# Patient Record
Sex: MALE | Race: BLACK OR AFRICAN AMERICAN | Employment: FULL TIME | URBAN - METROPOLITAN AREA
[De-identification: names, ages, dates, MRNs, and addresses within clinical notes are randomized per-mention and may not be internally consistent; named-entity substitution may affect disease eponyms.]

---

## 2019-07-12 ENCOUNTER — HOSPITAL ENCOUNTER (OUTPATIENT)
Dept: PHYSICAL THERAPY | Age: 34
Discharge: HOME | End: 2019-07-12
Payer: COMMERCIAL

## 2019-07-12 PROCEDURE — 97161 PT EVAL LOW COMPLEX 20 MIN: CPT | Performed by: PHYSICAL THERAPIST

## 2019-07-12 PROCEDURE — 97016 VASOPNEUMATIC DEVICE THERAPY: CPT | Performed by: PHYSICAL THERAPIST

## 2019-07-12 PROCEDURE — 97110 THERAPEUTIC EXERCISES: CPT | Performed by: PHYSICAL THERAPIST

## 2019-07-12 NOTE — PROGRESS NOTES
PT INITIAL EVALUATION NOTE - [facility] 2-15    Patient Name: Oneida Yates  Date:2019  : 1985  [x]  Patient  Verified  Payor: BLUE MUNA / Plan: Juan Miguel Zheng [id number] PPO / Product Type: PPO /    In time:710a  Out time:825a  Total Treatment Time (min): 75  Total Timed Codes (min): 15  1:1 Treatment Time ( only): 15   Visit #: 1     Treatment Area: Left ankle pain [M25.572]    SUBJECTIVE  Pain Level (0-10 scale): 0/10  Any medication changes, allergies to medications, adverse drug reactions, diagnosis change, or new procedure performed?: [] No    [x] Yes (see summary sheet for update)  Subjective:    Mid 2019 injured left achilles and heel while playing basketball resulting in onset of pain. He then felt pain  and swelling after running after someone at work. 2019 s/p Left achilles tendon repair and removal of bone spur on calcaneous. Walking boot was removed on 2019. No pain with walking or going down stairs right now but he cannot run. Wants to return to running. 2019 is last schedule appointment with Dr Jose Seymour.        OBJECTIVE/EXAMINATION  Posture:  Pes planus foot posture  Other Observations:  Incision healed well, Edema present at left lower leg   Girth Measurement Left: Mid calf 40.5 cm, Malleoli 31.8 cm            Right: Mid calf 42.5 cm, Malleoli 42.5 cm   Gait and Functional Mobility:  Squat ankle DF 12  Palpation: tenderness at incision and calcaneus    Left Knee AROM 0/045 PROM 2/0/48  Ankle DF with knee flexed 2  Right Knee AROM 10/0/42 PROM 12/0/45  Ankle DF with knee flexed 13    Joint Mobility Assessment: talocrural A-P and P-A hypomobility    Flexibility: gastroc/solues stiffness                  LOWER QUARTER   MUSCLE STRENGTH  KEY       R  L  0 - No Contraction  Knee ext  5  5  1 - Trace            flex  5  5  2 - Poor   Hip ext   5  5  3 - Fair          flex   5  5  4 - Good         abd  5  5  5 - Normal         add  5  5      Ankle DF  5  >3                PF  5  >3                INV  5  >3                EV  5  >3      Neurological: Reflexes / Sensations: normal  Special Tests: Kiersten's sign: negative    Modality rationale: decrease edema and decrease pain to improve the patients ability to ambulate, run and cut   Min Type Additional Details    [] Estim: []Att   []Unatt        []TENS instruct                  []IFC  []Premod   []NMES                     []Other:  []w/US   []w/ice   []w/heat  Position:  Location:    []  Traction: [] Cervical       []Lumbar                       [] Prone          []Supine                       []Intermittent   []Continuous Lbs:  [] before manual  [] after manual  []w/heat    []  Ultrasound: []Continuous   [] Pulsed at:                           []1MHz   []3MHz Location:  W/cm2:    [] Paraffin         Location:   []w/heat    []  Ice     []  Heat  []  Ice massage Position:  Location:    []  Laser  []  Other: Position:  Location:   15   [x]  Vasopneumatic Device Pressure:       [] lo [x] med [] hi   Temperature: 29 F     [x] Skin assessment post-treatment:  [x]intact []redness- no adverse reaction    []redness  adverse reaction:     15 min Therapeutic Exercise:  [x] See flow sheet :   Rationale: increase ROM, increase strength, improve coordination, improve balance and increase proprioception to improve the patients ability to ambulate, squat, run and cut            With   [x] TE   [] TA   [] neuro   [] other: Patient Education: [x] Review HEP    [] Progressed/Changed HEP based on:   [] positioning   [] body mechanics   [] transfers   [] heat/ice application    [] other:      Other Objective/Functional Measures:  FOTO Functional Measure: 57/100    Pain Level (0-10 scale) post treatment: 0/10    ASSESSMENT/Changes in Function:     [x]  See Plan of Care      Jose Coyle, PT, DPT 7/12/2019

## 2019-07-16 ENCOUNTER — HOSPITAL ENCOUNTER (OUTPATIENT)
Dept: PHYSICAL THERAPY | Age: 34
Discharge: HOME | End: 2019-07-16
Payer: COMMERCIAL

## 2019-07-16 PROCEDURE — 97110 THERAPEUTIC EXERCISES: CPT | Performed by: PHYSICAL THERAPIST

## 2019-07-16 PROCEDURE — 97140 MANUAL THERAPY 1/> REGIONS: CPT | Performed by: PHYSICAL THERAPIST

## 2019-07-16 PROCEDURE — 97016 VASOPNEUMATIC DEVICE THERAPY: CPT | Performed by: PHYSICAL THERAPIST

## 2019-07-16 NOTE — PROGRESS NOTES
PT DAILY TREATMENT NOTE 2-15    Patient Name: Samantha Rod  Date:2019  : 1985  [x]  Patient  Verified  Payor: BLUE CROSS / Plan: [facility] PPO / Product Type: PPO /    In time:1037a  Out time:1147a  Total Treatment Time (min): 70  Visit #:  2    Treatment Area: Left ankle pain [M25.572]    SUBJECTIVE  Pain Level (0-10 scale): 0/10  Any medication changes, allergies to medications, adverse drug reactions, diagnosis change, or new procedure performed?: [x] No    [] Yes (see summary sheet for update)  Subjective functional status/changes:   [] No changes reported  Pt reports that he is doing well with the HEP. He has a compression sock that seems to be helping.     OBJECTIVE    Modality rationale: decrease edema and decrease pain to improve the patients ability to ambulation   Min Type Additional Details       [] Estim: []Att   []Unatt    []TENS instruct                  []IFC  []Premod   []NMES                     []Other:  []w/US   []w/ice   []w/heat  Position:  Location:       []  Traction: [] Cervical       []Lumbar                       [] Prone          []Supine                       []Intermittent   []Continuous Lbs:  [] before manual  [] after manual  []w/heat    []  Ultrasound: []Continuous   [] Pulsed                       at: []1MHz   []3MHz Location:  W/cm2:    [] Paraffin         Location:   []w/heat    []  Ice     []  Heat  []  Ice massage Position:  Location:    []  Laser  []  Other: Position:  Location:   15   []  Vasopneumatic Device Pressure:       [] lo [x] med [] hi   Temperature: 34F     [x] Skin assessment post-treatment:  [x]intact []redness- no adverse reaction    []redness  adverse reaction:          40 min Therapeutic Exercise:  [x] See flow sheet : passive ankle DF stretching   Rationale: increase ROM, increase strength, improve coordination, improve balance and increase proprioception to improve the patients ability to ambulate, run and cut    15 min Manual Therapy:  STM incision and calf muscle; A-P talocrural mobs grade 4   Rationale: decrease pain, increase ROM and increase tissue extensibility  to improve the patients ability to ambulate, run and cut      With   [] TE   [] TA   [] neuro   [] other: Patient Education: [x] Review HEP    [] Progressed/Changed HEP based on:   [] positioning   [] body mechanics   [] transfers   [] heat/ice application    [] other:      Other Objective/Functional Measures: Left Ankle DF PROM  2-->8     Pain Level (0-10 scale) post treatment: 0/10    ASSESSMENT/Changes in Function:   No increase of pain with stretching and strengthening activities today. Improved ankle DF following manual and stretching. Patient will continue to benefit from skilled PT services to modify and progress therapeutic interventions, address functional mobility deficits, address ROM deficits, address strength deficits, analyze and address soft tissue restrictions, analyze and cue movement patterns, analyze and modify body mechanics/ergonomics and assess and modify postural abnormalities to attain remaining goals. []  See Plan of Care  []  See progress note/recertification  []  See Discharge Summary         Progress towards goals / Updated goals:  Short Term Goals: To be accomplished in 3-4 weeks:  1)  Pt will be independent with HEP. 2) Pt will demonstrate >/= 6 degrees to be able to ambulate on level surface with more normalized gait without increase of pain  3) Pt will be able to navigate stairs with step over step pattern without pain.        Long Term Goals: To be accomplished in 20-24 weeks:  1)  Pt will be able to squat to the ground without deviation and even distribution of weight. 2) Pt will be able to perform jumping motion without valgus collapse of knee durign concentric and eccentric phases   3) Pt will be able to run >/= 1 mile without pain. 4) Pt will be able to perform lateral cutting movements without pain or instability.   5) Pt will be able to demonstrate rotational control with jumping movements >/= to contralateral limb.       PLAN  [x]  Upgrade activities as tolerated     [x]  Continue plan of care  [x]  Update interventions per flow sheet       []  Discharge due to:_  []  Other:_      Nj Corbin, PT, DPT 7/16/2019

## 2019-07-17 NOTE — PROGRESS NOTES
[facility]  [address] ([place]), [address] [facility] Donna Shankar  Phone: [phone] Fax: [phone]    Plan of Care/Statement of Necessity for Physical Therapy Services  2-15    Patient name: Henretta Cranker  : 1985  Provider#: [id number]  Referral source: Bienvenido Escobar MD      Medical/Treatment Diagnosis: Left ankle pain [M25.572]     Prior Hospitalization: see medical history     Comorbidities: none  Prior Level of Function: complete 20 minutes of exercise at least 3 times a week;  and  Jajah Umpire  Medications: Verified on Patient Summary List     Start of Care: 2019                                                                 Onset Date: 2019                                  The Plan of Care and following information is based on the information from the initial evaluation. Assessment/ rubio information: 37 y.o.  Male s/p Left achilles tendon re-insertion and bone excision with limitation in ROM, strength and functional ability secondary to tissue healing and surgical precautions.     Evaluation Complexity History LOW Complexity : Zero comorbidities / personal factors that will impact the outcome / POC; Examination HIGH Complexity : 4+ Standardized tests and measures addressing body structure, function, activity limitation and / or participation in recreation  ;Presentation LOW Complexity : Stable, uncomplicated  ;Clinical Decision Making MEDIUM Complexity : FOTO score of 26-74  Overall Complexity Rating: LOW      Problem List: pain affecting function, decrease ROM, decrease strength, edema affecting function, impaired gait/ balance, decrease ADL/ functional abilitiies, decrease activity tolerance and decrease flexibility/ joint mobility               Treatment Plan may include any combination of the following: Therapeutic exercise, Therapeutic activities, Neuromuscular re-education, Physical agent/modality, Gait/balance training, Manual therapy, Patient education and Self Care training  Patient / Family readiness to learn indicated by: asking questions and trying to perform skills  Persons(s) to be included in education: patient (P)  Barriers to Learning/Limitations: None  Patient Goal (s): return to running, squatting and work activities  Patient Self Reported Health Status: good  Rehabilitation Potential: good     Short Term Goals: To be accomplished in 3-4 weeks:  1)  Pt will be independent with HEP. 2) Pt will demonstrate >/= 6 degrees to be able to ambulate on level surface with more normalized gait without increase of pain  3) Pt will be able to navigate stairs with step over step pattern without pain.        Long Term Goals: To be accomplished in 20-24 weeks:  1)  Pt will be able to squat to the ground without deviation and even distribution of weight. 2) Pt will be able to perform jumping motion without valgus collapse of knee durign concentric and eccentric phases   3) Pt will be able to run >/= 1 mile without pain. 4) Pt will be able to perform lateral cutting movements without pain or instability. 5) Pt will be able to demonstrate rotational control with jumping movements >/= to contralateral limb.     Frequency / Duration: Patient to be seen 2 times per week for 20-24 weeks (will reduce to 1 time per week once edema and ROM stabilizes).      Patient/ Caregiver education and instruction: activity modification and exercises     [x]  Plan of care has been reviewed with TERENCE Floyd PT, DPT 7/12/2019   ________________________________________________________________________    I certify that the above Therapy Services are being furnished while the patient is under my care. I agree with the treatment plan and certify that this therapy is necessary.     [de-identified] Signature:____________________  Date:____________Time: _________

## 2019-07-19 ENCOUNTER — HOSPITAL ENCOUNTER (OUTPATIENT)
Dept: PHYSICAL THERAPY | Age: 34
Discharge: HOME | End: 2019-07-19
Payer: COMMERCIAL

## 2019-07-19 PROCEDURE — 97016 VASOPNEUMATIC DEVICE THERAPY: CPT

## 2019-07-19 PROCEDURE — 97140 MANUAL THERAPY 1/> REGIONS: CPT

## 2019-07-19 PROCEDURE — 97110 THERAPEUTIC EXERCISES: CPT

## 2019-07-19 NOTE — PROGRESS NOTES
PT DAILY TREATMENT NOTE - [facility] 2-15    Patient Name: Samantha Rod  Date:2019  : 1985  [x]  Patient  Verified  Payor: BLUE CROSS / Plan: Juan Miguel Zheng [id number] PPO / Product Type: PPO /    In time:809  Out time:915  Total Treatment Time (min): 66  Total Timed Codes (min): 66  1:1 Treatment Time (MC only): 39   Visit #:  3    Treatment Area: Left ankle pain [M25.572]    SUBJECTIVE  Pain Level (0-10 scale): 0/10  Any medication changes, allergies to medications, adverse drug reactions, diagnosis change, or new procedure performed?: [x] No    [] Yes (see summary sheet for update)  Subjective functional status/changes:   [] No changes reported  Patient reports he has been fatigued with the heel raises more than he had expected.     OBJECTIVE    Modality rationale: decrease inflammation and decrease pain to improve the patients ability to perform ADLs and reduce pain levels   Min Type Additional Details       [] Estim: []Att   []Unatt    []TENS instruct                  []IFC  []Premod   []NMES                     []Other:  []w/US   []w/ice   []w/heat  Position:  Location:       []  Traction: [] Cervical       []Lumbar                       [] Prone          []Supine                       []Intermittent   []Continuous Lbs:  [] before manual  [] after manual  []w/heat    []  Ultrasound: []Continuous   [] Pulsed                       at: []1MHz   []3MHz Location:  W/cm2:    [] Paraffin         Location:   []w/heat    []  Ice     []  Heat  []  Ice massage Position:  Location:    []  Laser  []  Other: Position:  Location:   15   [x]  Vasopneumatic Device Pressure:       [] lo [x] med [] hi   Temperature: 34     [x] Skin assessment post-treatment:  [x]intact []redness- no adverse reaction    []redness  adverse reaction:     41 min Therapeutic Exercise:  [x] See flow sheet :   Rationale: increase ROM and increase strength to improve the patients ability to perform ADLs and reduce pain levels     10 min Manual Therapy:  STM incision and calf muscle; A-P talocrural mobs grade 3-4   Rationale: decrease pain, increase ROM and increase tissue extensibility  to improve the patients ability to perform ADLs and return to a full work load     With   [] TEroc   [] TA   [] neuro   [] other: Patient Education: [x] Review HEP    [] Progressed/Changed HEP based on:   [] positioning   [] body mechanics   [] transfers   [] heat/ice application    [] other:      Other Objective/Functional Measures: none noted     Pain Level (0-10 scale) post treatment: 0/10    ASSESSMENT/Changes in Function:   Patient demonstrates instability with balancing therex. Noticeable tightness with the gastroc during manual. Will continue to progress as tolerated. Patient will continue to benefit from skilled PT services to modify and progress therapeutic interventions, address functional mobility deficits, address ROM deficits, address strength deficits, analyze and address soft tissue restrictions, analyze and cue movement patterns, analyze and modify body mechanics/ergonomics and assess and modify postural abnormalities to attain remaining goals. [x]  See Plan of Care  []  See progress note/recertification  []  See Discharge Summary         Progress towards goals / Updated goals:  Patient is progressing towards goals.      PLAN  [x]  Upgrade activities as tolerated     [x]  Continue plan of care  [x]  Update interventions per flow sheet       []  Discharge due to:_  []  Other:Melissa Cox 7/19/2019

## 2019-07-23 ENCOUNTER — HOSPITAL ENCOUNTER (OUTPATIENT)
Dept: PHYSICAL THERAPY | Age: 34
Discharge: HOME | End: 2019-07-23
Payer: COMMERCIAL

## 2019-07-23 PROCEDURE — 97140 MANUAL THERAPY 1/> REGIONS: CPT

## 2019-07-23 PROCEDURE — 97110 THERAPEUTIC EXERCISES: CPT

## 2019-07-23 PROCEDURE — 97016 VASOPNEUMATIC DEVICE THERAPY: CPT

## 2019-07-23 NOTE — PROGRESS NOTES
PT DAILY TREATMENT NOTE - [facility] 2-15    Patient Name: Henretta Cranker  Date:2019  : 1985  [x]  Patient  Verified  Payor: BLUE MUNA / Plan: Juan Miguel Zheng [id number] PPO / Product Type: PPO /    In time:432  Out time:550  Total Treatment Time (min): 78  Total Timed Codes (min): 78  1:1 Treatment Time (MC only): 40   Visit #:  4    Treatment Area: Left ankle pain [M25.572]    SUBJECTIVE   Pain Level (0-10 scale): 0/10  Any medication changes, allergies to medications, adverse drug reactions, diagnosis change, or new procedure performed?: [x] No    [] Yes (see summary sheet for update)  Subjective functional status/changes:   [] No changes reported  Patient reports he feels pressure when he is walking around like there is no cushion on his heel.     OBJECTIVE    Modality rationale: decrease inflammation and decrease pain to improve the patients ability to perform ADLs and reduce pain levels    Min Type Additional Details        [] Estim: []Att   []Unatt    []TENS instruct                  []IFC  []Premod   []NMES                     []Other:  []w/US   []w/ice   []w/heat  Position:  Location:        []  Traction: [] Cervical       []Lumbar                       [] Prone          []Supine                       []Intermittent   []Continuous Lbs:  [] before manual  [] after manual  []w/heat     []  Ultrasound: []Continuous   [] Pulsed                       at: []1MHz   []3MHz Location:  W/cm2:     [] Paraffin         Location:   []w/heat     []  Ice     []  Heat  []  Ice massage Position:  Location:     []  Laser  []  Other: Position:  Location:   15    [x]  Vasopneumatic Device Pressure:       [] lo [x] med [] hi   Temperature: 34      [x] Skin assessment post-treatment:  [x]intact []redness- no adverse reaction    []redness  adverse reaction:     53 min Therapeutic Exercise:  [x] See flow sheet :   Rationale: increase ROM and increase strength to improve the patients ability to perform ADLs and reduce pain levels    10 min Manual Therapy:  STM incision and calf muscle; A-P talocrural mobs grade 3-4   Rationale: decrease pain, increase ROM and increase tissue extensibility  to improve the patients ability to perform ADLs and return to a full work load     With   [] TE   [] TA   [] neuro   [] other: Patient Education: [x] Review HEP    [] Progressed/Changed HEP based on:   [] positioning   [] body mechanics   [] transfers   [] heat/ice application    [] other:      Other Objective/Functional Measures: none noted     Pain Level (0-10 scale) post treatment: 0/10    ASSESSMENT/Changes in Function:   Patient tolerated all new and progressed therex well. Slight difficulty performing single leg stance on foam but no discomfort or pain. Fair motion while using the BAPs board. Will continue to progress as tolerated. Patient will continue to benefit from skilled PT services to modify and progress therapeutic interventions, address functional mobility deficits, address ROM deficits, address strength deficits, analyze and address soft tissue restrictions, analyze and cue movement patterns, analyze and modify body mechanics/ergonomics and assess and modify postural abnormalities to attain remaining goals. [x]  See Plan of Care  []  See progress note/recertification  []  See Discharge Summary         Progress towards goals / Updated goals:  Patient is proigressing towards goals.      PLAN  [x]  Upgrade activities as tolerated     [x]  Continue plan of care  [x]  Update interventions per flow sheet       []  Discharge due to:_  []  Other:_      Alfred Olivier 7/23/2019

## 2019-07-26 ENCOUNTER — HOSPITAL ENCOUNTER (OUTPATIENT)
Dept: PHYSICAL THERAPY | Age: 34
Discharge: HOME | End: 2019-07-26
Payer: COMMERCIAL

## 2019-07-26 PROCEDURE — 97016 VASOPNEUMATIC DEVICE THERAPY: CPT

## 2019-07-26 PROCEDURE — 97110 THERAPEUTIC EXERCISES: CPT

## 2019-07-26 PROCEDURE — 97140 MANUAL THERAPY 1/> REGIONS: CPT

## 2019-07-26 NOTE — PROGRESS NOTES
PT DAILY TREATMENT NOTE - [facility] 2-15    Patient Name: Neha Roblero  Date:2019  : 1985  [x]  Patient  Verified  Payor: Coral Domínguez / Plan: Juan Miguel Zheng [id number] PPO / Product Type: PPO /    In time:8:32 am  Out time:9:58 am  Total Treatment Time (min): 86 minutes  Total Timed Codes (min): 71 minutes  1:1 Treatment Time (MC only): 24 minutes   Visit #:  5    Treatment Area: Left ankle pain [M25.572]    SUBJECTIVE   Pain Level (0-10 scale): 0/10  Any medication changes, allergies to medications, adverse drug reactions, diagnosis change, or new procedure performed?: [x] No    [] Yes (see summary sheet for update)  Subjective functional status/changes:   [] No changes reported  Pt reports he has been doing well, just mild discomfort with heel raises.     OBJECTIVE    Modality rationale: decrease inflammation and decrease pain to improve the patients ability to perform ADLs and reduce pain levels    Min Type Additional Details        [] Estim: []Att   []Unatt    []TENS instruct                  []IFC  []Premod   []NMES                     []Other:  []w/US   []w/ice   []w/heat  Position:  Location:        []  Traction: [] Cervical       []Lumbar                       [] Prone          []Supine                       []Intermittent   []Continuous Lbs:  [] before manual  [] after manual  []w/heat     []  Ultrasound: []Continuous   [] Pulsed                       at: []1MHz   []3MHz Location:  W/cm2:     [] Paraffin         Location:   []w/heat     []  Ice     []  Heat  []  Ice massage Position:  Location:     []  Laser  []  Other: Position:  Location:   15    [x]  Vasopneumatic Device Pressure:       [] lo [x] med [] hi   Temperature: 34      [x] Skin assessment post-treatment:  [x]intact []redness- no adverse reaction    []redness  adverse reaction:     59 min Therapeutic Exercise:  [x] See flow sheet :   Rationale: increase ROM and increase strength to improve the patients ability to perform ADLs and reduce pain levels    12 min Manual Therapy:  STM/MFR incision and gastroc; A-P talocrural mobs grade 3-4   Rationale: decrease pain, increase ROM and increase tissue extensibility  to improve the patients ability to perform ADLs and return to a full work load     With   [] TE   [] TA   [] neuro   [] other: Patient Education: [x] Review HEP    [] Progressed/Changed HEP based on:   [] positioning   [] body mechanics   [] transfers   [] heat/ice application    [] other:      Other Objective/Functional Measures: none noted     Pain Level (0-10 scale) post treatment: 0/10    ASSESSMENT/Changes in Function:   Turgor and tenderness noted within lateral left gastroc; eased discomfort/tightness with ambulation afterwards. Patient will continue to benefit from skilled PT services to modify and progress therapeutic interventions, address functional mobility deficits, address ROM deficits, address strength deficits, analyze and address soft tissue restrictions, analyze and cue movement patterns, analyze and modify body mechanics/ergonomics and assess and modify postural abnormalities to attain remaining goals. [x]  See Plan of Care  []  See progress note/recertification  []  See Discharge Summary         Progress towards goals / Updated goals:  Patient is proigressing towards goals.      PLAN  [x]  Upgrade activities as tolerated     [x]  Continue plan of care  [x]  Update interventions per flow sheet       []  Discharge due to:_  []  Other:_      Dominic Gavin 7/26/2019

## 2019-07-30 ENCOUNTER — HOSPITAL ENCOUNTER (OUTPATIENT)
Dept: PHYSICAL THERAPY | Age: 34
Discharge: HOME | End: 2019-07-30
Payer: COMMERCIAL

## 2019-07-30 PROCEDURE — 97110 THERAPEUTIC EXERCISES: CPT | Performed by: PHYSICAL THERAPIST

## 2019-07-30 PROCEDURE — 97016 VASOPNEUMATIC DEVICE THERAPY: CPT | Performed by: PHYSICAL THERAPIST

## 2019-07-30 PROCEDURE — 97140 MANUAL THERAPY 1/> REGIONS: CPT | Performed by: PHYSICAL THERAPIST

## 2019-07-30 NOTE — PROGRESS NOTES
PT DAILY TREATMENT NOTE - [facility] 2-15    Patient Name: Matthias Nearing  Date:2019  : 1985  [x]  Patient  Verified  Payor: Grabiel Maryam / Plan: Juan Miguel Zheng [id number] PPO / Product Type: PPO /    In time: 804a Out time: 944a  Total Treatment Time (min): 100  Total Timed Codes (min):  85  1:1 Treatment Time ( only): 40   Visit #:  6    Treatment Area: Left ankle pain [M25.572]    SUBJECTIVE   Pain Level (0-10 scale): 0/10  Any medication changes, allergies to medications, adverse drug reactions, diagnosis change, or new procedure performed?: [x] No    [] Yes (see summary sheet for update)  Subjective functional status/changes:   [] No changes reported  Pt reports that he was at a festival this weekend and he did start to notice soreness at his heel (achilles insertion area) after he got home. No pain today.     OBJECTIVE    Modality rationale: decrease inflammation and decrease pain to improve the patients ability to perform ADLs and reduce pain levels    Min Type Additional Details        [] Estim: []Att   []Unatt    []TENS instruct                  []IFC  []Premod   []NMES                     []Other:  []w/US   []w/ice   []w/heat  Position:  Location:        []  Traction: [] Cervical       []Lumbar                       [] Prone          []Supine                       []Intermittent   []Continuous Lbs:  [] before manual  [] after manual  []w/heat     []  Ultrasound: []Continuous   [] Pulsed                       at: []1MHz   []3MHz Location:  W/cm2:     [] Paraffin         Location:   []w/heat     []  Ice     []  Heat  []  Ice massage Position:  Location:     []  Laser  []  Other: Position:  Location:   15    [x]  Vasopneumatic Device Pressure:       [] lo [x] med [] hi   Temperature: 34      [x] Skin assessment post-treatment:  [x]intact []redness- no adverse reaction    []redness  adverse reaction:     70 min Therapeutic Exercise:  [x] See flow sheet : passive ankle DF stetching   Rationale: increase ROM and increase strength to improve the patients ability to perform ADLs and reduce pain levels    15 min Manual Therapy:  STM/MFR incision and gastroc; A-P talocrural mobs grade 3-4   Rationale: decrease pain, increase ROM and increase tissue extensibility  to improve the patients ability to perform ADLs and return to a full work load     With   [] TE   [] TA   [] neuro   [] other: Patient Education: [x] Review HEP    [] Progressed/Changed HEP based on:   [] positioning   [] body mechanics   [] transfers   [] heat/ice application    [] other:      Other Objective/Functional Measures: Left Ankle AROM DF 11   PROM DF 16     Pain Level (0-10 scale) post treatment: 0/10    ASSESSMENT/Changes in Function:   Ankle DF is progressing. Gait pattern improves with manual and therex with less of an early lift off. Early lift off returns after game ready ice/compression. Patient will continue to benefit from skilled PT services to modify and progress therapeutic interventions, address functional mobility deficits, address ROM deficits, address strength deficits, analyze and address soft tissue restrictions, analyze and cue movement patterns, analyze and modify body mechanics/ergonomics and assess and modify postural abnormalities to attain remaining goals. [x]  See Plan of Care  []  See progress note/recertification  []  See Discharge Summary         Progress towards goals / Updated goals:  Short Term Goals: To be accomplished in 3-4 weeks:  1)  Pt will be independent with HEP. MET  2) Pt will demonstrate >/= 6 degrees to be able to ambulate on level surface with more normalized gait without increase of pain MET  3) Pt will be able to navigate stairs with step over step pattern without pain. MET      Long Term Goals: To be accomplished in 20-24 weeks:  1)  Pt will be able to squat to the ground without deviation and even distribution of weight.   2) Pt will be able to perform jumping motion without valgus collapse of knee durign concentric and eccentric phases   3) Pt will be able to run >/= 1 mile without pain. 4) Pt will be able to perform lateral cutting movements without pain or instability.   5) Pt will be able to demonstrate rotational control with jumping movements >/= to contralateral limb.   .     PLAN  [x]  Upgrade activities as tolerated     [x]  Continue plan of care  [x]  Update interventions per flow sheet       []  Discharge due to:_  []  Other:_      Jackie Godfrey, PT, DPT 7/30/2019

## 2019-08-02 ENCOUNTER — HOSPITAL ENCOUNTER (OUTPATIENT)
Dept: PHYSICAL THERAPY | Age: 34
Discharge: HOME | End: 2019-08-02
Payer: COMMERCIAL

## 2019-08-02 PROCEDURE — 97110 THERAPEUTIC EXERCISES: CPT | Performed by: PHYSICAL THERAPIST

## 2019-08-02 PROCEDURE — 97140 MANUAL THERAPY 1/> REGIONS: CPT | Performed by: PHYSICAL THERAPIST

## 2019-08-02 PROCEDURE — 97016 VASOPNEUMATIC DEVICE THERAPY: CPT | Performed by: PHYSICAL THERAPIST

## 2019-08-02 NOTE — PROGRESS NOTES
PT DAILY TREATMENT NOTE - [facility] 2-15    Patient Name: Frankey Holes  Date:2019  : 1985  [x]  Patient  Verified  Payor: BLUE CROSS / Plan: Juan Miguel Zheng [id number] PPO / Product Type: PPO /    In time: 200a Out time: 945a  Total Treatment Time (min): 70  Total Timed Codes (min):  55  1:1 Treatment Time (MC only): 30  Visit #:  7    Treatment Area: Left ankle pain [M25.572]    SUBJECTIVE   Pain Level (0-10 scale): 0/10  Any medication changes, allergies to medications, adverse drug reactions, diagnosis change, or new procedure performed?: [x] No    [] Yes (see summary sheet for update)  Subjective functional status/changes:   [] No changes reported  Pt reports that he has his normal sorness but not any issues.     OBJECTIVE    Modality rationale: decrease inflammation and decrease pain to improve the patients ability to perform ADLs and reduce pain levels    Min Type Additional Details        [] Estim: []Att   []Unatt    []TENS instruct                  []IFC  []Premod   []NMES                     []Other:  []w/US   []w/ice   []w/heat  Position:  Location:        []  Traction: [] Cervical       []Lumbar                       [] Prone          []Supine                       []Intermittent   []Continuous Lbs:  [] before manual  [] after manual  []w/heat     []  Ultrasound: []Continuous   [] Pulsed                       at: []1MHz   []3MHz Location:  W/cm2:     [] Paraffin         Location:   []w/heat     []  Ice     []  Heat  []  Ice massage Position:  Location:     []  Laser  []  Other: Position:  Location:   15    [x]  Vasopneumatic Device Pressure:       [] lo [x] med [] hi   Temperature: 34      [x] Skin assessment post-treatment:  [x]intact []redness- no adverse reaction    []redness  adverse reaction:     30 min Therapeutic Exercise:  [x] See flow sheet : passive ankle DF stetching   Rationale: increase ROM and increase strength to improve the patients ability to perform ADLs and reduce pain levels    10 min Neuromuscular Re-education:  [x]  See flow sheet : BAPS board L3, and rocker board   Rationale: increase ROM, increase strength, improve coordination, improve balance and increase proprioception  to improve the patients ability to maintain dynamic balance with activity       15 min Manual Therapy:  STM/MFR incision and gastroc; A-P talocrural mobs grade 3-4   Rationale: decrease pain, increase ROM and increase tissue extensibility  to improve the patients ability to perform ADLs and return to a full work load     With   [] TE   [] TA   [] neuro   [] other: Patient Education: [x] Review HEP    [] Progressed/Changed HEP based on:   [] positioning   [] body mechanics   [] transfers   [] heat/ice application    [] other:      Other Objective/Functional Measures: Left Ankle AROM DF 11   PROM DF 16     Pain Level (0-10 scale) post treatment: 0/10    ASSESSMENT/Changes in Function:   Pushign end range ankle DF as tolerated. Still presenting with limited ankle DF affecting squat performance. Patient will continue to benefit from skilled PT services to modify and progress therapeutic interventions, address functional mobility deficits, address ROM deficits, address strength deficits, analyze and address soft tissue restrictions, analyze and cue movement patterns, analyze and modify body mechanics/ergonomics and assess and modify postural abnormalities to attain remaining goals. [x]  See Plan of Care  []  See progress note/recertification  []  See Discharge Summary         Progress towards goals / Updated goals:  Short Term Goals: To be accomplished in 3-4 weeks:  1)  Pt will be independent with HEP. MET  2) Pt will demonstrate >/= 6 degrees to be able to ambulate on level surface with more normalized gait without increase of pain MET  3) Pt will be able to navigate stairs with step over step pattern without pain.  Jennifer Torres 10 be accomplished in 20-24 weeks:  1)  Pt will be able to squat to the ground without deviation and even distribution of weight. 2) Pt will be able to perform jumping motion without valgus collapse of knee durign concentric and eccentric phases   3) Pt will be able to run >/= 1 mile without pain. 4) Pt will be able to perform lateral cutting movements without pain or instability.   5) Pt will be able to demonstrate rotational control with jumping movements >/= to contralateral limb.   .     PLAN  [x]  Upgrade activities as tolerated     [x]  Continue plan of care  [x]  Update interventions per flow sheet       []  Discharge due to:_  []  Other:_      Nora Lind PT, DPT 8/2/2019

## 2019-08-05 ENCOUNTER — HOSPITAL ENCOUNTER (OUTPATIENT)
Dept: PHYSICAL THERAPY | Age: 34
Discharge: HOME | End: 2019-08-05
Payer: COMMERCIAL

## 2019-08-05 PROCEDURE — 97110 THERAPEUTIC EXERCISES: CPT | Performed by: PHYSICAL THERAPIST

## 2019-08-05 PROCEDURE — 97140 MANUAL THERAPY 1/> REGIONS: CPT | Performed by: PHYSICAL THERAPIST

## 2019-08-05 PROCEDURE — 97112 NEUROMUSCULAR REEDUCATION: CPT | Performed by: PHYSICAL THERAPIST

## 2019-08-05 PROCEDURE — 97016 VASOPNEUMATIC DEVICE THERAPY: CPT | Performed by: PHYSICAL THERAPIST

## 2019-08-05 NOTE — PROGRESS NOTES
PT DAILY TREATMENT NOTE - [facility] 2-15    Patient Name: Jann Small  Date:2019  : 1985  [x]  Patient  Verified  Payor: Zane Mehnaz / Plan: Juan Miguel Zheng 5747 PPO / Product Type: PPO /    In time: 355p Out time: 515p  Total Treatment Time (min): 80  Total Timed Codes (min):  65  1:1 Treatment Time ( only): 60  Visit #:  8    Treatment Area: Left ankle pain [M25.572]    SUBJECTIVE   Pain Level (0-10 scale): 0/10  Any medication changes, allergies to medications, adverse drug reactions, diagnosis change, or new procedure performed?: [x] No    [] Yes (see summary sheet for update)  Subjective functional status/changes:   [] No changes reported  Pt reports that he is no longer feeling the \"Raw\" feeling in his heel walking his dog for 15 minutes.     OBJECTIVE    Modality rationale: decrease inflammation and decrease pain to improve the patients ability to perform ADLs and reduce pain levels    Min Type Additional Details        [] Estim: []Att   []Unatt    []TENS instruct                  []IFC  []Premod   []NMES                     []Other:  []w/US   []w/ice   []w/heat  Position:  Location:        []  Traction: [] Cervical       []Lumbar                       [] Prone          []Supine                       []Intermittent   []Continuous Lbs:  [] before manual  [] after manual  []w/heat     []  Ultrasound: []Continuous   [] Pulsed                       at: []1MHz   []3MHz Location:  W/cm2:     [] Paraffin         Location:   []w/heat     []  Ice     []  Heat  []  Ice massage Position:  Location:     []  Laser  []  Other: Position:  Location:   15    [x]  Vasopneumatic Device Pressure:       [] lo [x] med [] hi   Temperature: 34      [x] Skin assessment post-treatment:  [x]intact []redness- no adverse reaction    []redness  adverse reaction:     35 min Therapeutic Exercise:  [x] See flow sheet : passive ankle DF stetching   Rationale: increase ROM and increase strength to improve the patients ability to perform ADLs and reduce pain levels    15 min Neuromuscular Re-education:  [x]  See flow sheet : BAPS board L3, and rocker board   Rationale: increase ROM, increase strength, improve coordination, improve balance and increase proprioception  to improve the patients ability to maintain dynamic balance with activity       15 min Manual Therapy:  STM/MFR incision and gastroc; A-P talocrural mobs grade 3-4   Rationale: decrease pain, increase ROM and increase tissue extensibility  to improve the patients ability to perform ADLs and return to a full work load     With   [] TE   [] TA   [] neuro   [] other: Patient Education: [x] Review HEP    [] Progressed/Changed HEP based on:   [] positioning   [] body mechanics   [] transfers   [] heat/ice application    [] other:      Other Objective/Functional Measures: Left Ankle AROM DF 11   PROM DF 16     Pain Level (0-10 scale) post treatment: 0/10    ASSESSMENT/Changes in Function:   Pushing end range ankle DF as tolerated. Still presenting with limited ankle DF affecting squat performance and altered gait performance. Patient will continue to benefit from skilled PT services to modify and progress therapeutic interventions, address functional mobility deficits, address ROM deficits, address strength deficits, analyze and address soft tissue restrictions, analyze and cue movement patterns, analyze and modify body mechanics/ergonomics and assess and modify postural abnormalities to attain remaining goals. []  See Plan of Care  []  See progress note/recertification  []  See Discharge Summary         Progress towards goals / Updated goals:  Short Term Goals: To be accomplished in 3-4 weeks:  1)  Pt will be independent with HEP. MET  2) Pt will demonstrate >/= 6 degrees to be able to ambulate on level surface with more normalized gait without increase of pain MET  3) Pt will be able to navigate stairs with step over step pattern without pain.  Jennifer Torres 10 be accomplished in 20-24 weeks:  1)  Pt will be able to squat to the ground without deviation and even distribution of weight. 2) Pt will be able to perform jumping motion without valgus collapse of knee durign concentric and eccentric phases   3) Pt will be able to run >/= 1 mile without pain. 4) Pt will be able to perform lateral cutting movements without pain or instability.   5) Pt will be able to demonstrate rotational control with jumping movements >/= to contralateral limb.   .     PLAN  [x]  Upgrade activities as tolerated     [x]  Continue plan of care  [x]  Update interventions per flow sheet       []  Discharge due to:_  []  Other:_      Dieudonne Finney, PT, DPT 8/5/2019

## 2019-08-05 NOTE — PROGRESS NOTES
[facility]  [address] ([address]), [address] [facility]  [address] [place] [address] [address], [address]  Phone: [phone] Fax: [phone]    Progress Note    Name: Olivia Selby   : 1985   MD: Sofia Guzman MD       Treatment Diagnosis: Left ankle pain [M25.572]  Start of Care: 2019    Visits from Start of Care: 8  Missed Visits: 0    Summary of Care: Chris Thacker reports that he is noticing a reduction in heel soreness with standing/walking as of late. He does still have altered gait pattern with early heel lift at push-off. Strength and ROM continue to progress. Ankle dorsiflexion limited by stiffness not pain. Left Ankle PROM Dorsiflexion 14 degrees    Assessment / Recommendations: Continue working on progressing ROM, strength, proprioception and gait pattern per protocol as tolerated. Please advise if you see anything additional in your exam.    Progress towards goals / Updated goals:  Short Term Goals: To be accomplished in 3-4 weeks:  1)  Pt will be independent with HEP. MET  2) Pt will demonstrate >/= 6 degrees to be able to ambulate on level surface with more normalized gait without increase of pain MET  3) Pt will be able to navigate stairs with step over step pattern without pain. MET      Long Term Goals: To be accomplished in 20-24 weeks:  1)  Pt will be able to squat to the ground without deviation and even distribution of weight. 2) Pt will be able to perform jumping motion without valgus collapse of knee durign concentric and eccentric phases   3) Pt will be able to run >/= 1 mile without pain. 4) Pt will be able to perform lateral cutting movements without pain or instability. 5) Pt will be able to demonstrate rotational control with jumping movements >/= to contralateral limb.   .       Juan C Grider, PT, DPT 2019     ________________________________________________________________________  NOTE TO PHYSICIAN:  Please complete the following and fax to:   [facility]: [phone]  . Retain this original for your records. If you are unable to process this request in 24 hours, please contact our office.        ____ I have read the above report and request that my patient continue therapy with the following changes/special instructions:  ____ I have read the above report and request that my patient be discharged from therapy    Physician's Signature:_________________ Date:___________Time:__________

## 2019-08-09 ENCOUNTER — HOSPITAL ENCOUNTER (OUTPATIENT)
Dept: PHYSICAL THERAPY | Age: 34
Discharge: HOME | End: 2019-08-09
Payer: COMMERCIAL

## 2019-08-09 PROCEDURE — 97016 VASOPNEUMATIC DEVICE THERAPY: CPT | Performed by: PHYSICAL THERAPIST

## 2019-08-09 PROCEDURE — 97116 GAIT TRAINING THERAPY: CPT | Performed by: PHYSICAL THERAPIST

## 2019-08-09 PROCEDURE — 97140 MANUAL THERAPY 1/> REGIONS: CPT | Performed by: PHYSICAL THERAPIST

## 2019-08-09 PROCEDURE — 97112 NEUROMUSCULAR REEDUCATION: CPT | Performed by: PHYSICAL THERAPIST

## 2019-08-09 NOTE — PROGRESS NOTES
PT DAILY TREATMENT NOTE - [facility] 2-15    Patient Name: Warden Lieberman  Date:2019  : 1985  [x]  Patient  Verified  Payor: Bridget Faye / Plan: Juan Miguel Zheng 5747 PPO / Product Type: PPO /    In time: 200a Out time: 940a   Total Treatment Time (min): 90  Total Timed Codes (min):  75  1:1 Treatment Time ( only): 40  Visit #:  9    Treatment Area: Left ankle pain [M25.572]    SUBJECTIVE   Pain Level (0-10 scale): 0/10  Any medication changes, allergies to medications, adverse drug reactions, diagnosis change, or new procedure performed?: [x] No    [] Yes (see summary sheet for update)  Subjective functional status/changes:   [] No changes reported  Pt reports that he is not any more sore from last session.     OBJECTIVE    Modality rationale: decrease inflammation and decrease pain to improve the patients ability to perform ADLs and reduce pain levels    Min Type Additional Details        [] Estim: []Att   []Unatt    []TENS instruct                  []IFC  []Premod   []NMES                     []Other:  []w/US   []w/ice   []w/heat  Position:  Location:        []  Traction: [] Cervical       []Lumbar                       [] Prone          []Supine                       []Intermittent   []Continuous Lbs:  [] before manual  [] after manual  []w/heat     []  Ultrasound: []Continuous   [] Pulsed                       at: []1MHz   []3MHz Location:  W/cm2:     [] Paraffin         Location:   []w/heat     []  Ice     []  Heat  []  Ice massage Position:  Location:     []  Laser  []  Other: Position:  Location:   15    [x]  Vasopneumatic Device Pressure:       [] lo [x] med [] hi   Temperature: 34      [x] Skin assessment post-treatment:  [x]intact []redness- no adverse reaction    []redness  adverse reaction:     40 min Therapeutic Exercise:  [x] See flow sheet : passive ankle DF stetching with knee extended and flexed   Rationale: increase ROM and increase strength to improve the patients ability to perform ADLs and reduce pain levels    10 min Neuromuscular Re-education:  [x]  See flow sheet : BAPS board L3, and wobble board ankle plantarflexion with cueing for isolated movement balancing body weight over ankle   Rationale: increase ROM, increase strength, improve coordination, improve balance and increase proprioception  to improve the patients ability to maintain dynamic balance with activity     10 min Gait Trainin minutes on treadmill at 0.5 mph with focus on push off phase, then cueing and education with walking on flat ground incorporating push off more accurately   Rationale: increase ROM, increase strength, improve coordination, improve balance and increase proprioception  to improve the patients ability to ambulate normally      15 min Manual Therapy:  STM/MFR incision and gastroc; A-P talocrural mobs grade 3-4   Rationale: decrease pain, increase ROM and increase tissue extensibility  to improve the patients ability to perform ADLs and return to a full work load     With   [] TE   [] TA   [] neuro   [] other: Patient Education: [x] Review HEP    [] Progressed/Changed HEP based on:   [] positioning   [] body mechanics   [] transfers   [] heat/ice application    [] other:      Other Objective/Functional Measures: Left Ankle AROM DF 11   PROM DF 17     Pain Level (0-10 scale) post treatment: 0/10    ASSESSMENT/Changes in Function:   Demonstrated normalized gait pattern with appropriate amount of push off to prpell body forward post cueing. Patient will continue to benefit from skilled PT services to modify and progress therapeutic interventions, address functional mobility deficits, address ROM deficits, address strength deficits, analyze and address soft tissue restrictions, analyze and cue movement patterns, analyze and modify body mechanics/ergonomics and assess and modify postural abnormalities to attain remaining goals.      []  See Plan of Care  []  See progress note/recertification  []  See Discharge Summary         Progress towards goals / Updated goals:  Short Term Goals: To be accomplished in 3-4 weeks:  1)  Pt will be independent with HEP. MET  2) Pt will demonstrate >/= 6 degrees to be able to ambulate on level surface with more normalized gait without increase of pain MET  3) Pt will be able to navigate stairs with step over step pattern without pain. MET      Long Term Goals: To be accomplished in 20-24 weeks:  1)  Pt will be able to squat to the ground without deviation and even distribution of weight. 2) Pt will be able to perform jumping motion without valgus collapse of knee durign concentric and eccentric phases   3) Pt will be able to run >/= 1 mile without pain. 4) Pt will be able to perform lateral cutting movements without pain or instability.   5) Pt will be able to demonstrate rotational control with jumping movements >/= to contralateral limb.   .     PLAN  [x]  Upgrade activities as tolerated     [x]  Continue plan of care  [x]  Update interventions per flow sheet       []  Discharge due to:_  []  Other:_      Amedeo Smoker, PT, DPT 8/9/2019

## 2019-08-12 ENCOUNTER — HOSPITAL ENCOUNTER (OUTPATIENT)
Dept: PHYSICAL THERAPY | Age: 34
Discharge: HOME | End: 2019-08-12
Payer: COMMERCIAL

## 2019-08-12 PROCEDURE — 97110 THERAPEUTIC EXERCISES: CPT

## 2019-08-12 PROCEDURE — 97140 MANUAL THERAPY 1/> REGIONS: CPT

## 2019-08-12 PROCEDURE — 97016 VASOPNEUMATIC DEVICE THERAPY: CPT

## 2019-08-12 NOTE — PROGRESS NOTES
PT DAILY TREATMENT NOTE - [facility] 2-15    Patient Name: Sharita Ellington  Date:2019  : 1985  [x]  Patient  Verified  Payor: BLUE CROSS / Plan: Juan Miguel Zheng [id number] PPO / Product Type: PPO /    In time:807  Out time:930  Total Treatment Time (min): 83  Total Timed Codes (min): 83  1:1 Treatment Time ( only): 54   Visit #:  10    Treatment Area: Left ankle pain [M25.572]    SUBJECTIVE  Pain Level (0-10 scale): 0/10  Any medication changes, allergies to medications, adverse drug reactions, diagnosis change, or new procedure performed?: [x] No    [] Yes (see summary sheet for update)  Subjective functional status/changes:   [] No changes reported  Patient reports he was [place] the [place] this past weekend and felt fine the entire day, but did need to sit down and take a rest at the end of the day due to fatigue and soreness. OBJECTIVE    Modality rationale: decrease inflammation and decrease pain to improve the patients ability to perform ADLs and reduce pain levels.    Min Type Additional Details       [] Estim: []Att   []Unatt    []TENS instruct                  []IFC  []Premod   []NMES                     []Other:  []w/US   []w/ice   []w/heat  Position:  Location:       []  Traction: [] Cervical       []Lumbar                       [] Prone          []Supine                       []Intermittent   []Continuous Lbs:  [] before manual  [] after manual  []w/heat    []  Ultrasound: []Continuous   [] Pulsed                       at: []1MHz   []3MHz Location:  W/cm2:    [] Paraffin         Location:   []w/heat    []  Ice     []  Heat  []  Ice massage Position:  Location:    []  Laser  []  Other: Position:  Location:   15   [x]  Vasopneumatic Device Pressure:       [] lo [x] med [] hi   Temperature: 34     [x] Skin assessment post-treatment:  [x]intact []redness- no adverse reaction    []redness  adverse reaction:     53 min Therapeutic Exercise:  [x] See flow sheet :   Rationale: increase ROM and increase strength to improve the patients ability to perform ADLs and reduce pain levels    15 min Manual Therapy:  STM/MFR incision and gastroc; A-P talocrural mobs grade 3-4   Rationale: decrease pain, increase ROM and increase tissue extensibility  to improve the patients ability to perform ADLs and reduce pain levels          With   [] TE   [] TA   [] neuro   [] other: Patient Education: [x] Review HEP    [] Progressed/Changed HEP based on:   [] positioning   [] body mechanics   [] transfers   [] heat/ice application    [] other:      Other Objective/Functional Measures: none noted     Pain Level (0-10 scale) post treatment: 0/10    ASSESSMENT/Changes in Function:   Patient experienced slight difficulty controlling his decent with single leg squats. Fair static single leg balance. Discussed trying to ride his bike again. Continues to show poor push off when ambulating, as well as heel strike. Will continue to ess as tolerated. Patient will continue to benefit from skilled PT services to modify and progress therapeutic interventions, address functional mobility deficits, address ROM deficits, address strength deficits, analyze and address soft tissue restrictions, analyze and cue movement patterns, analyze and modify body mechanics/ergonomics and assess and modify postural abnormalities to attain remaining goals. [x]  See Plan of Care  []  See progress note/recertification  []  See Discharge Summary         Progress towards goals / Updated goals:  Patient is progressing towards goals.     PLAN  [x]  Upgrade activities as tolerated     [x]  Continue plan of care  [x]  Update interventions per flow sheet       []  Discharge due to:_  []  Other:Melissa Rooney 8/12/2019

## 2019-08-16 ENCOUNTER — HOSPITAL ENCOUNTER (OUTPATIENT)
Dept: PHYSICAL THERAPY | Age: 34
Discharge: HOME | End: 2019-08-16
Payer: COMMERCIAL

## 2019-08-16 PROCEDURE — 97110 THERAPEUTIC EXERCISES: CPT

## 2019-08-16 PROCEDURE — 97140 MANUAL THERAPY 1/> REGIONS: CPT

## 2019-08-16 PROCEDURE — 97016 VASOPNEUMATIC DEVICE THERAPY: CPT

## 2019-08-16 NOTE — PROGRESS NOTES
PT DAILY TREATMENT NOTE - [facility] 2-15    Patient Name: Joie Horowitz  Date:2019  : 1985  [x]  Patient  Verified  Payor: BLUE CROSS / Plan: Juan Miguel Zheng [id number] PPO / Product Type: PPO /    In time:800  Out time:930  Total Treatment Time (min): 90  Total Timed Codes (min): 90  1:1 Treatment Time (MC only): 55   Visit #:  11    Treatment Area: Left ankle pain [M25.572]    SUBJECTIVE  Pain Level (0-10 scale): 0/10  Any medication changes, allergies to medications, adverse drug reactions, diagnosis change, or new procedure performed?: [x] No    [] Yes (see summary sheet for update)  Subjective functional status/changes:   [] No changes reported  Patient reports he was walking around a good bit on grass yesterday watching people qualify for SWAT team, he felt fine while doing it but is a little sore today.      OBJECTIVE    Modality rationale: decrease inflammation and decrease pain to improve the patients ability to perform ADLs and reduce pain levels   Min Type Additional Details       [] Estim: []Att   []Unatt    []TENS instruct                  []IFC  []Premod   []NMES                     []Other:  []w/US   []w/ice   []w/heat  Position:  Location:       []  Traction: [] Cervical       []Lumbar                       [] Prone          []Supine                       []Intermittent   []Continuous Lbs:  [] before manual  [] after manual  []w/heat    []  Ultrasound: []Continuous   [] Pulsed                       at: []1MHz   []3MHz Location:  W/cm2:    [] Paraffin         Location:   []w/heat    []  Ice     []  Heat  []  Ice massage Position:  Location:    []  Laser  []  Other: Position:  Location:     15 [x]  Vasopneumatic Device Pressure:       [] lo [x] med [] hi   Temperature: 34     [x] Skin assessment post-treatment:  [x]intact []redness- no adverse reaction    []redness  adverse reaction:     60 min Therapeutic Exercise:  [x] See flow sheet :   Rationale: increase ROM and increase strength to improve the patients ability to perform ADLs and reduce pain levels    15 min Manual Therapy:  STM/MFR incision and gastroc; A-P talocrural mobs grade 3-4   Rationale: decrease pain, increase ROM and increase tissue extensibility  to improve the patients ability to perform ADLs and reduce pain levels                         With   [] TE   [] TA   [] neuro   [] other: Patient Education: [x] Review HEP    [] Progressed/Changed HEP based on:   [] positioning   [] body mechanics   [] transfers   [] heat/ice application    [] other:      Other Objective/Functional Measures: none noted     Pain Level (0-10 scale) post treatment: 0/10    ASSESSMENT/Changes in Function:   Patient demonstrates an improved gait on the LLE, continues to need to work on heel strike. Improved ankle stability on uneven surfaces. Will continue to progress as tolerated. Patient will continue to benefit from skilled PT services to modify and progress therapeutic interventions, address functional mobility deficits, address ROM deficits, address strength deficits, analyze and address soft tissue restrictions, analyze and cue movement patterns, analyze and modify body mechanics/ergonomics and assess and modify postural abnormalities to attain remaining goals. [x]  See Plan of Care  []  See progress note/recertification  []  See Discharge Summary         Progress towards goals / Updated goals:  Patient is progressing towards goals.      PLAN  [x]  Upgrade activities as tolerated     [x]  Continue plan of care  [x]  Update interventions per flow sheet       []  Discharge due to:_  []  Other:_      Alma Feliciano 8/16/2019

## 2019-08-19 ENCOUNTER — HOSPITAL ENCOUNTER (OUTPATIENT)
Dept: PHYSICAL THERAPY | Age: 34
Discharge: HOME | End: 2019-08-19
Payer: COMMERCIAL

## 2019-08-19 PROCEDURE — 97116 GAIT TRAINING THERAPY: CPT | Performed by: PHYSICAL THERAPIST

## 2019-08-19 PROCEDURE — 97016 VASOPNEUMATIC DEVICE THERAPY: CPT | Performed by: PHYSICAL THERAPIST

## 2019-08-19 PROCEDURE — 97140 MANUAL THERAPY 1/> REGIONS: CPT | Performed by: PHYSICAL THERAPIST

## 2019-08-19 PROCEDURE — 97112 NEUROMUSCULAR REEDUCATION: CPT | Performed by: PHYSICAL THERAPIST

## 2019-08-19 NOTE — PROGRESS NOTES
PT DAILY TREATMENT NOTE - [facility] 2-15    Patient Name: Jonnie Bright  Date:2019  : 1985  [x]  Patient  Verified  Payor: Killian Locke / Plan: Juan Miguel Zheng [id number] PPO / Product Type: PPO /    In time: 400p Out time: 520p  Total Treatment Time (min): 80  Total Timed Codes (min): 75  1:1 Treatment Time ( only): 45  Visit #:  12    Treatment Area: Left ankle pain [M25.572]    SUBJECTIVE  Pain Level (0-10 scale): 0/10  Any medication changes, allergies to medications, adverse drug reactions, diagnosis change, or new procedure performed?: [x] No    [] Yes (see summary sheet for update)  Subjective functional status/changes:   [] No changes reported  Patient reports he felt great this weekend with no pain walking around his house. He does cathc himself still lifting his foot off too early.   OBJECTIVE    Modality rationale: decrease inflammation and decrease pain to improve the patients ability to perform ADLs and reduce pain levels   Min Type Additional Details       [] Estim: []Att   []Unatt    []TENS instruct                  []IFC  []Premod   []NMES                     []Other:  []w/US   []w/ice   []w/heat  Position:  Location:       []  Traction: [] Cervical       []Lumbar                       [] Prone          []Supine                       []Intermittent   []Continuous Lbs:  [] before manual  [] after manual  []w/heat    []  Ultrasound: []Continuous   [] Pulsed                       at: []1MHz   []3MHz Location:  W/cm2:    [] Paraffin         Location:   []w/heat    []  Ice     []  Heat  []  Ice massage Position:  Location:    []  Laser  []  Other: Position:  Location:     15 [x]  Vasopneumatic Device Pressure:       [] lo [x] med [] hi   Temperature: 34     [x] Skin assessment post-treatment:  [x]intact []redness- no adverse reaction    []redness  adverse reaction:     45 min Therapeutic Exercise:  [x] See flow sheet :   Rationale: increase ROM and increase strength to improve the patients ability to perform ADLs and reduce pain levels    15 min Neuromuscular Re-education:  [x]  See flow sheet : BAPS board L3, and wobble board ankle plantarflexion with cueing for isolated movement balancing body weight over ankle   Rationale: increase ROM, increase strength, improve coordination, improve balance and increase proprioception  to improve the patients ability to maintain dynamic balance with activity        15 min Manual Therapy:  STM/MFR incision and gastroc; A-P talocrural mobs grade 3-4   Rationale: decrease pain, increase ROM and increase tissue extensibility  to improve the patients ability to perform ADLs and reduce pain levels                         With   [] TE   [] TA   [] neuro   [] other: Patient Education: [x] Review HEP    [] Progressed/Changed HEP based on:   [] positioning   [] body mechanics   [] transfers   [] heat/ice application    [] other:      Other Objective/Functional Measures: none noted     Pain Level (0-10 scale) post treatment: 0/10    ASSESSMENT/Changes in Function:   Gait pattern improves with cueing. Does report \"tightness\" in distal achilles with ankle plantarflexion in closed chain. Patient will continue to benefit from skilled PT services to modify and progress therapeutic interventions, address functional mobility deficits, address ROM deficits, address strength deficits, analyze and address soft tissue restrictions, analyze and cue movement patterns, analyze and modify body mechanics/ergonomics and assess and modify postural abnormalities to attain remaining goals. []  See Plan of Care  []  See progress note/recertification  []  See Discharge Summary         Progress towards goals / Updated goals:  Short Term Goals: To be accomplished in 3-4 weeks:  1)  Pt will be independent with HEP.  MET  2) Pt will demonstrate >/= 6 degrees to be able to ambulate on level surface with more normalized gait without increase of pain MET  3) Pt will be able to navigate stairs with step over step pattern without pain. MET      Long Term Goals: To be accomplished in 20-24 weeks:  1)  Pt will be able to squat to the ground without deviation and even distribution of weight. 2) Pt will be able to perform jumping motion without valgus collapse of knee durign concentric and eccentric phases   3) Pt will be able to run >/= 1 mile without pain. 4) Pt will be able to perform lateral cutting movements without pain or instability.   5) Pt will be able to demonstrate rotational control with jumping movements >/= to contralateral limb.   . .     PLAN  [x]  Upgrade activities as tolerated     [x]  Continue plan of care  [x]  Update interventions per flow sheet       []  Discharge due to:_  []  Other:_      Amedeo Smoker, PT, DPT 8/19/2019

## 2019-08-23 ENCOUNTER — HOSPITAL ENCOUNTER (OUTPATIENT)
Dept: PHYSICAL THERAPY | Age: 34
Discharge: HOME | End: 2019-08-23
Payer: COMMERCIAL

## 2019-08-23 PROCEDURE — 97016 VASOPNEUMATIC DEVICE THERAPY: CPT | Performed by: PHYSICAL THERAPIST

## 2019-08-23 PROCEDURE — 97140 MANUAL THERAPY 1/> REGIONS: CPT | Performed by: PHYSICAL THERAPIST

## 2019-08-23 PROCEDURE — 97112 NEUROMUSCULAR REEDUCATION: CPT | Performed by: PHYSICAL THERAPIST

## 2019-08-23 PROCEDURE — 97110 THERAPEUTIC EXERCISES: CPT | Performed by: PHYSICAL THERAPIST

## 2019-08-23 NOTE — PROGRESS NOTES
PT DAILY TREATMENT NOTE - [facility] 2-15    Patient Name: Clayton Chaves  Date:2019  : 1985  [x]  Patient  Verified  Payor: BLUE CROSS / Plan: Juan Miguel Zehng [id number] PPO / Product Type: PPO /    In time: 26a Out time: 930a  Total Treatment Time (min): 80  Total Timed Codes (min): 75  1:1 Treatment Time ( only): 45  Visit #:  13    Treatment Area: Left ankle pain [M25.572]    SUBJECTIVE  Pain Level (0-10 scale): 0/10  Any medication changes, allergies to medications, adverse drug reactions, diagnosis change, or new procedure performed?: [x] No    [] Yes (see summary sheet for update)  Subjective functional status/changes:   [] No changes reported  Patient reports that he feels his gait is much better when he is paying attention to it.     OBJECTIVE    Modality rationale: decrease inflammation and decrease pain to improve the patients ability to perform ADLs and reduce pain levels   Min Type Additional Details       [] Estim: []Att   []Unatt    []TENS instruct                  []IFC  []Premod   []NMES                     []Other:  []w/US   []w/ice   []w/heat  Position:  Location:       []  Traction: [] Cervical       []Lumbar                       [] Prone          []Supine                       []Intermittent   []Continuous Lbs:  [] before manual  [] after manual  []w/heat    []  Ultrasound: []Continuous   [] Pulsed                       at: []1MHz   []3MHz Location:  W/cm2:    [] Paraffin         Location:   []w/heat    []  Ice     []  Heat  []  Ice massage Position:  Location:    []  Laser  []  Other: Position:  Location:     15 [x]  Vasopneumatic Device Pressure:       [] lo [x] med [] hi   Temperature: 34     [x] Skin assessment post-treatment:  [x]intact []redness- no adverse reaction    []redness  adverse reaction:     45 min Therapeutic Exercise:  [x] See flow sheet :   Rationale: increase ROM and increase strength to improve the patients ability to perform ADLs and reduce pain levels    15 min Neuromuscular Re-education:  [x]  See flow sheet : BAPS board L3, and wobble board ankle plantarflexion with cueing for isolated movement balancing body weight over ankle   Rationale: increase ROM, increase strength, improve coordination, improve balance and increase proprioception  to improve the patients ability to maintain dynamic balance with activity        15 min Manual Therapy:  STM/MFR incision and gastroc; A-P talocrural mobs grade 3-4   Rationale: decrease pain, increase ROM and increase tissue extensibility  to improve the patients ability to perform ADLs and reduce pain levels                         With   [] TE   [] TA   [] neuro   [] other: Patient Education: [x] Review HEP    [] Progressed/Changed HEP based on:   [] positioning   [] body mechanics   [] transfers   [] heat/ice application    [] other:      Other Objective/Functional Measures: none noted     Pain Level (0-10 scale) post treatment: 0/10    ASSESSMENT/Changes in Function:   Advanced with Dips and lunges (forward and lateral) without increased pain. Patient will continue to benefit from skilled PT services to modify and progress therapeutic interventions, address functional mobility deficits, address ROM deficits, address strength deficits, analyze and address soft tissue restrictions, analyze and cue movement patterns, analyze and modify body mechanics/ergonomics and assess and modify postural abnormalities to attain remaining goals. []  See Plan of Care  []  See progress note/recertification  []  See Discharge Summary         Progress towards goals / Updated goals:  Short Term Goals: To be accomplished in 3-4 weeks:  1)  Pt will be independent with HEP. MET  2) Pt will demonstrate >/= 6 degrees to be able to ambulate on level surface with more normalized gait without increase of pain MET  3) Pt will be able to navigate stairs with step over step pattern without pain.  MET      Long Term Goals: To be accomplished in 20-24 weeks:  1)  Pt will be able to squat to the ground without deviation and even distribution of weight. 2) Pt will be able to perform jumping motion without valgus collapse of knee durign concentric and eccentric phases   3) Pt will be able to run >/= 1 mile without pain. 4) Pt will be able to perform lateral cutting movements without pain or instability.   5) Pt will be able to demonstrate rotational control with jumping movements >/= to contralateral limb.   . .     PLAN  [x]  Upgrade activities as tolerated     [x]  Continue plan of care  [x]  Update interventions per flow sheet       []  Discharge due to:_  []  Other:_      Abran Wolfe, PT, DPT 8/23/2019

## 2019-08-27 ENCOUNTER — HOSPITAL ENCOUNTER (OUTPATIENT)
Dept: PHYSICAL THERAPY | Age: 34
Discharge: HOME | End: 2019-08-27
Payer: COMMERCIAL

## 2019-08-27 PROCEDURE — 97112 NEUROMUSCULAR REEDUCATION: CPT | Performed by: PHYSICAL THERAPIST

## 2019-08-27 PROCEDURE — 97016 VASOPNEUMATIC DEVICE THERAPY: CPT | Performed by: PHYSICAL THERAPIST

## 2019-08-27 PROCEDURE — 97140 MANUAL THERAPY 1/> REGIONS: CPT | Performed by: PHYSICAL THERAPIST

## 2019-08-27 PROCEDURE — 97110 THERAPEUTIC EXERCISES: CPT | Performed by: PHYSICAL THERAPIST

## 2019-08-27 NOTE — PROGRESS NOTES
Daniel Rich PT DAILY TREATMENT NOTE - [facility] 2-15    Patient Name: Jonnie Bright  Date:2019  : 1985  [x]  Patient  Verified  Payor: BLUE MUNA / Plan: Juan Miguel Zheng 5747 PPO / Product Type: PPO /    In time: 26a Out time: 930a  Total Treatment Time (min): 80  Total Timed Codes (min): 75  1:1 Treatment Time ( only): 60  Visit #:  14    Treatment Area: Left ankle pain [M25.572]    SUBJECTIVE  Pain Level (0-10 scale): 0/10  Any medication changes, allergies to medications, adverse drug reactions, diagnosis change, or new procedure performed?: [x] No    [] Yes (see summary sheet for update)  Subjective functional status/changes:   [] No changes reported  Patient reports he notices a reduction in tightness over the weekend again. He does not report any major change in overall activity but is able to stretch more frequently.     OBJECTIVE    Modality rationale: decrease inflammation and decrease pain to improve the patients ability to perform ADLs and reduce pain levels   Min Type Additional Details       [] Estim: []Att   []Unatt    []TENS instruct                  []IFC  []Premod   []NMES                     []Other:  []w/US   []w/ice   []w/heat  Position:  Location:       []  Traction: [] Cervical       []Lumbar                       [] Prone          []Supine                       []Intermittent   []Continuous Lbs:  [] before manual  [] after manual  []w/heat    []  Ultrasound: []Continuous   [] Pulsed                       at: []1MHz   []3MHz Location:  W/cm2:    [] Paraffin         Location:   []w/heat    []  Ice     []  Heat  []  Ice massage Position:  Location:    []  Laser  []  Other: Position:  Location:     15 [x]  Vasopneumatic Device Pressure:       [] lo [x] med [] hi   Temperature: 34     [x] Skin assessment post-treatment:  [x]intact []redness- no adverse reaction    []redness  adverse reaction:     45 min Therapeutic Exercise:  [x] See flow sheet :   Rationale: increase ROM and increase strength to improve the patients ability to perform ADLs and reduce pain levels    15 min Neuromuscular Re-education:  [x]  See flow sheet : BAPS board L3, and wobble board ankle plantarflexion with cueing for isolated movement balancing body weight over ankle   Rationale: increase ROM, increase strength, improve coordination, improve balance and increase proprioception  to improve the patients ability to maintain dynamic balance with activity        15 min Manual Therapy:  STM/MFR incision and gastroc; A-P talocrural mobs grade 3-4   Rationale: decrease pain, increase ROM and increase tissue extensibility  to improve the patients ability to perform ADLs and reduce pain levels                         With   [] TE   [] TA   [] neuro   [] other: Patient Education: [x] Review HEP    [] Progressed/Changed HEP based on:   [] positioning   [] body mechanics   [] transfers   [] heat/ice application    [] other:      Other Objective/Functional Measures: none noted     Pain Level (0-10 scale) post treatment: 0/10    ASSESSMENT/Changes in Function:   Ankle plantarflexion and dorsiflexion is improving with both closed chain and unstable surface performance. Still demonstrates instability on the unstable surface but no pain and less inversion/eversion deviation. Patient will continue to benefit from skilled PT services to modify and progress therapeutic interventions, address functional mobility deficits, address ROM deficits, address strength deficits, analyze and address soft tissue restrictions, analyze and cue movement patterns, analyze and modify body mechanics/ergonomics and assess and modify postural abnormalities to attain remaining goals. []  See Plan of Care  []  See progress note/recertification  []  See Discharge Summary         Progress towards goals / Updated goals:  Short Term Goals: To be accomplished in 3-4 weeks:  1)  Pt will be independent with HEP.  MET  2) Pt will demonstrate >/= 6 degrees to be able to ambulate on level surface with more normalized gait without increase of pain MET  3) Pt will be able to navigate stairs with step over step pattern without pain. MET      Long Term Goals: To be accomplished in 20-24 weeks:  1)  Pt will be able to squat to the ground without deviation and even distribution of weight. 2) Pt will be able to perform jumping motion without valgus collapse of knee durign concentric and eccentric phases   3) Pt will be able to run >/= 1 mile without pain. 4) Pt will be able to perform lateral cutting movements without pain or instability.   5) Pt will be able to demonstrate rotational control with jumping movements >/= to contralateral limb.   . .     PLAN  [x]  Upgrade activities as tolerated     [x]  Continue plan of care  [x]  Update interventions per flow sheet       []  Discharge due to:_  []  Other:_      Stacey Ruffin, PT, DPT 8/27/2019

## 2019-08-30 ENCOUNTER — HOSPITAL ENCOUNTER (OUTPATIENT)
Dept: PHYSICAL THERAPY | Age: 34
Discharge: HOME | End: 2019-08-30
Payer: COMMERCIAL

## 2019-08-30 PROCEDURE — 97016 VASOPNEUMATIC DEVICE THERAPY: CPT | Performed by: PHYSICAL THERAPIST

## 2019-08-30 PROCEDURE — 97140 MANUAL THERAPY 1/> REGIONS: CPT | Performed by: PHYSICAL THERAPIST

## 2019-08-30 PROCEDURE — 97112 NEUROMUSCULAR REEDUCATION: CPT | Performed by: PHYSICAL THERAPIST

## 2019-08-30 PROCEDURE — 97110 THERAPEUTIC EXERCISES: CPT | Performed by: PHYSICAL THERAPIST

## 2019-08-30 NOTE — PROGRESS NOTES
Duarte Mitchell PT DAILY TREATMENT NOTE - [facility] 2-15    Patient Name: Milady Tinajero  Date:2019  : 1985  [x]  Patient  Verified  Payor: Chirag Mcmahan / Plan: Juan Miguel Zheng [id number] PPO / Product Type: PPO /    In time: 803a Out time: 930a  Total Treatment Time (min): 77  Total Timed Codes (min): 73  1:1 Treatment Time ( only): 45  Visit #:  15    Treatment Area: Left ankle pain [M25.572]    SUBJECTIVE  Pain Level (0-10 scale): 0/10  Any medication changes, allergies to medications, adverse drug reactions, diagnosis change, or new procedure performed?: [x] No    [] Yes (see summary sheet for update)  Subjective functional status/changes:   [] No changes reported  Patient reports he noticed more stiffness while at rest sitting.     OBJECTIVE    Modality rationale: decrease inflammation and decrease pain to improve the patients ability to perform ADLs and reduce pain levels   Min Type Additional Details       [] Estim: []Att   []Unatt    []TENS instruct                  []IFC  []Premod   []NMES                     []Other:  []w/US   []w/ice   []w/heat  Position:  Location:       []  Traction: [] Cervical       []Lumbar                       [] Prone          []Supine                       []Intermittent   []Continuous Lbs:  [] before manual  [] after manual  []w/heat    []  Ultrasound: []Continuous   [] Pulsed                       at: []1MHz   []3MHz Location:  W/cm2:    [] Paraffin         Location:   []w/heat    []  Ice     []  Heat  []  Ice massage Position:  Location:    []  Laser  []  Other: Position:  Location:     15 [x]  Vasopneumatic Device Pressure:       [] lo [x] med [] hi   Temperature: 34     [x] Skin assessment post-treatment:  [x]intact []redness- no adverse reaction    []redness  adverse reaction:     45 min Therapeutic Exercise:  [x] See flow sheet :   Rationale: increase ROM and increase strength to improve the patients ability to perform ADLs and reduce pain levels    15 min Neuromuscular Re-education:  [x]  See flow sheet : BAPS board L3, and wobble board ankle plantarflexion with cueing for isolated movement balancing body weight over ankle   Rationale: increase ROM, increase strength, improve coordination, improve balance and increase proprioception  to improve the patients ability to maintain dynamic balance with activity        13 min Manual Therapy:  STM/MFR incision and gastroc; A-P talocrural mobs grade 3-4   Rationale: decrease pain, increase ROM and increase tissue extensibility  to improve the patients ability to perform ADLs and reduce pain levels                         With   [] TE   [] TA   [] neuro   [] other: Patient Education: [x] Review HEP    [] Progressed/Changed HEP based on:   [] positioning   [] body mechanics   [] transfers   [] heat/ice application    [] other:      Other Objective/Functional Measures: right girth maleolli 28.5 cm     Pain Level (0-10 scale) post treatment: 0/10    ASSESSMENT/Changes in Function:   Ankle plantarflexion control is improving as is his gait pattern with more appropriate push off. Patient will continue to benefit from skilled PT services to modify and progress therapeutic interventions, address functional mobility deficits, address ROM deficits, address strength deficits, analyze and address soft tissue restrictions, analyze and cue movement patterns, analyze and modify body mechanics/ergonomics and assess and modify postural abnormalities to attain remaining goals. []  See Plan of Care  []  See progress note/recertification  []  See Discharge Summary         Progress towards goals / Updated goals:  Short Term Goals: To be accomplished in 3-4 weeks:  1)  Pt will be independent with HEP. MET  2) Pt will demonstrate >/= 6 degrees to be able to ambulate on level surface with more normalized gait without increase of pain MET  3) Pt will be able to navigate stairs with step over step pattern without pain.  Jennifer Torres 10 be accomplished in 20-24 weeks:  1)  Pt will be able to squat to the ground without deviation and even distribution of weight. 2) Pt will be able to perform jumping motion without valgus collapse of knee durign concentric and eccentric phases   3) Pt will be able to run >/= 1 mile without pain. 4) Pt will be able to perform lateral cutting movements without pain or instability.   5) Pt will be able to demonstrate rotational control with jumping movements >/= to contralateral limb.   . .     PLAN  [x]  Upgrade activities as tolerated     [x]  Continue plan of care  [x]  Update interventions per flow sheet       []  Discharge due to:_  []  Other:_      Shonda Pineda PT, DPT 8/30/2019

## 2019-09-03 ENCOUNTER — HOSPITAL ENCOUNTER (OUTPATIENT)
Dept: PHYSICAL THERAPY | Age: 34
Discharge: HOME | End: 2019-09-03
Payer: COMMERCIAL

## 2019-09-03 PROCEDURE — 97016 VASOPNEUMATIC DEVICE THERAPY: CPT | Performed by: PHYSICAL THERAPIST

## 2019-09-03 PROCEDURE — 97110 THERAPEUTIC EXERCISES: CPT | Performed by: PHYSICAL THERAPIST

## 2019-09-03 PROCEDURE — 97140 MANUAL THERAPY 1/> REGIONS: CPT | Performed by: PHYSICAL THERAPIST

## 2019-09-03 PROCEDURE — 97112 NEUROMUSCULAR REEDUCATION: CPT | Performed by: PHYSICAL THERAPIST

## 2019-09-03 NOTE — PROGRESS NOTES
Marycarmen Sherman PT DAILY TREATMENT NOTE - [facility] -15    Patient Name: Clayton Chaves  Date:9/3/2019  : 1985  [x]  Patient  Verified  Payor: BLUE MUNA / Plan: Juan Miguel Zheng [id number] PPO / Product Type: PPO /    In time: 188D Out time: 1100p  Total Treatment Time (min): 80  Total Timed Codes (min): 75  1:1 Treatment Time ( only): 45  Visit #:  16    Treatment Area: Left ankle pain [M25.572]    SUBJECTIVE  Pain Level (0-10 scale): 0/10  Any medication changes, allergies to medications, adverse drug reactions, diagnosis change, or new procedure performed?: [x] No    [] Yes (see summary sheet for update)  Subjective functional status/changes:   [] No changes reported  Patient reports he notices a reduction in tightness over the weekend again. He does not report any major change in overall activity but is able to stretch more frequently.     OBJECTIVE    Modality rationale: decrease inflammation and decrease pain to improve the patients ability to perform ADLs and reduce pain levels   Min Type Additional Details       [] Estim: []Att   []Unatt    []TENS instruct                  []IFC  []Premod   []NMES                     []Other:  []w/US   []w/ice   []w/heat  Position:  Location:       []  Traction: [] Cervical       []Lumbar                       [] Prone          []Supine                       []Intermittent   []Continuous Lbs:  [] before manual  [] after manual  []w/heat    []  Ultrasound: []Continuous   [] Pulsed                       at: []1MHz   []3MHz Location:  W/cm2:    [] Paraffin         Location:   []w/heat    []  Ice     []  Heat  []  Ice massage Position:  Location:    []  Laser  []  Other: Position:  Location:     15 [x]  Vasopneumatic Device Pressure:       [] lo [x] med [] hi   Temperature: 34     [x] Skin assessment post-treatment:  [x]intact []redness- no adverse reaction    []redness  adverse reaction:     45 min Therapeutic Exercise:  [x] See flow sheet :   Rationale: increase ROM and increase strength to improve the patients ability to perform ADLs and reduce pain levels    15 min Neuromuscular Re-education:  [x]  See flow sheet : wobble board ankle plantarflexion with cueing for isolated movement balancing body weight over ankle; manual resistance isokinetic resistance ankle dorsiflexion/plantarflexion and inversion/eversion   Rationale: increase ROM, increase strength, improve coordination, improve balance and increase proprioception  to improve the patients ability to maintain dynamic balance with activity        15 min Manual Therapy:  STM/MFR incision and gastroc; A-P talocrural mobs grade 3-4   Rationale: decrease pain, increase ROM and increase tissue extensibility  to improve the patients ability to perform ADLs and reduce pain levels                         With   [] TE   [] TA   [] neuro   [] other: Patient Education: [x] Review HEP    [] Progressed/Changed HEP based on:   [] positioning   [] body mechanics   [] transfers   [] heat/ice application    [] other:      Other Objective/Functional Measures: --     Pain Level (0-10 scale) post treatment: 0/10    ASSESSMENT/Changes in Function:   Holding current level of resistance load for ankle plantarflexion as he is reporting soreness as the week goes on. Not advancing into lunges yet as he lack sufficient tolerance of force at achilles without over strain with right leg back. Patient will continue to benefit from skilled PT services to modify and progress therapeutic interventions, address functional mobility deficits, address ROM deficits, address strength deficits, analyze and address soft tissue restrictions, analyze and cue movement patterns, analyze and modify body mechanics/ergonomics and assess and modify postural abnormalities to attain remaining goals.      []  See Plan of Care  []  See progress note/recertification  []  See Discharge Summary         Progress towards goals / Updated goals:  Short Term Goals: To be accomplished in 3-4 weeks:  1)  Pt will be independent with HEP. MET  2) Pt will demonstrate >/= 6 degrees to be able to ambulate on level surface with more normalized gait without increase of pain MET  3) Pt will be able to navigate stairs with step over step pattern without pain. MET      Long Term Goals: To be accomplished in 20-24 weeks:  1)  Pt will be able to squat to the ground without deviation and even distribution of weight. 2) Pt will be able to perform jumping motion without valgus collapse of knee durign concentric and eccentric phases   3) Pt will be able to run >/= 1 mile without pain. 4) Pt will be able to perform lateral cutting movements without pain or instability.   5) Pt will be able to demonstrate rotational control with jumping movements >/= to contralateral limb.   . .     PLAN  [x]  Upgrade activities as tolerated     [x]  Continue plan of care  [x]  Update interventions per flow sheet       []  Discharge due to:_  []  Other:_      Segun Mcmillan, PT, DPT 9/3/2019

## 2019-09-04 ENCOUNTER — APPOINTMENT (OUTPATIENT)
Dept: PHYSICAL THERAPY | Age: 34
End: 2019-09-04
Payer: COMMERCIAL

## 2019-09-06 ENCOUNTER — HOSPITAL ENCOUNTER (OUTPATIENT)
Dept: PHYSICAL THERAPY | Age: 34
Discharge: HOME | End: 2019-09-06
Payer: COMMERCIAL

## 2019-09-06 PROCEDURE — 97016 VASOPNEUMATIC DEVICE THERAPY: CPT | Performed by: PHYSICAL THERAPIST

## 2019-09-06 PROCEDURE — 97110 THERAPEUTIC EXERCISES: CPT | Performed by: PHYSICAL THERAPIST

## 2019-09-06 PROCEDURE — 97140 MANUAL THERAPY 1/> REGIONS: CPT | Performed by: PHYSICAL THERAPIST

## 2019-09-06 PROCEDURE — 97112 NEUROMUSCULAR REEDUCATION: CPT | Performed by: PHYSICAL THERAPIST

## 2019-09-06 NOTE — PROGRESS NOTES
Tatum Muller PT DAILY TREATMENT NOTE - [facility] 2-15    Patient Name: Zohreh Cespedes  Date:2019  : 1985  [x]  Patient  Verified  Payor: Awilda José / Plan: Juan Miguel Zheng 5747 PPO / Product Type: PPO /    In time: 245C Out time: 950p  Total Treatment Time (min): 84  Total Timed Codes (min): 69  1:1 Treatment Time ( only): 60  Visit #:  17    Treatment Area: Left ankle pain [M25.572]    SUBJECTIVE  Pain Level (0-10 scale): 0/10  Any medication changes, allergies to medications, adverse drug reactions, diagnosis change, or new procedure performed?: [x] No    [] Yes (see summary sheet for update)  Subjective functional status/changes:   [] No changes reported  Patient reports he is not as stiff compared to last session but does still feel some tightness as the week goes on.     OBJECTIVE    Modality rationale: decrease inflammation and decrease pain to improve the patients ability to perform ADLs and reduce pain levels   Min Type Additional Details       [] Estim: []Att   []Unatt    []TENS instruct                  []IFC  []Premod   []NMES                     []Other:  []w/US   []w/ice   []w/heat  Position:  Location:       []  Traction: [] Cervical       []Lumbar                       [] Prone          []Supine                       []Intermittent   []Continuous Lbs:  [] before manual  [] after manual  []w/heat    []  Ultrasound: []Continuous   [] Pulsed                       at: []1MHz   []3MHz Location:  W/cm2:    [] Paraffin         Location:   []w/heat    []  Ice     []  Heat  []  Ice massage Position:  Location:    []  Laser  []  Other: Position:  Location:     15 [x]  Vasopneumatic Device Pressure:       [] lo [x] med [] hi   Temperature: 34     [x] Skin assessment post-treatment:  [x]intact []redness- no adverse reaction    []redness  adverse reaction:     40 min Therapeutic Exercise:  [x] See flow sheet :   Rationale: increase ROM and increase strength to improve the patients ability to perform ADLs and reduce pain levels    15 min Neuromuscular Re-education:  [x]  See flow sheet : wobble board ankle plantarflexion with cueing for isolated movement balancing body weight over ankle; manual resistance isokinetic resistance ankle dorsiflexion/plantarflexion and inversion/eversion   Rationale: increase ROM, increase strength, improve coordination, improve balance and increase proprioception  to improve the patients ability to maintain dynamic balance with activity        14 min Manual Therapy:  STM/MFR incision and gastroc; A-P talocrural mobs grade 3-4   Rationale: decrease pain, increase ROM and increase tissue extensibility  to improve the patients ability to perform ADLs and reduce pain levels                         With   [] TE   [] TA   [] neuro   [] other: Patient Education: [x] Review HEP    [] Progressed/Changed HEP based on:   [] positioning   [] body mechanics   [] transfers   [] heat/ice application    [] other:      Other Objective/Functional Measures: none noted     Pain Level (0-10 scale) post treatment: 0/10    ASSESSMENT/Changes in Function:   Patient will continue to benefit from skilled PT services to modify and progress therapeutic interventions, address functional mobility deficits, address ROM deficits, address strength deficits, analyze and address soft tissue restrictions, analyze and cue movement patterns, analyze and modify body mechanics/ergonomics and assess and modify postural abnormalities to attain remaining goals. []  See Plan of Care  []  See progress note/recertification  []  See Discharge Summary         Progress towards goals / Updated goals:  Short Term Goals: To be accomplished in 3-4 weeks:  1)  Pt will be independent with HEP. MET  2) Pt will demonstrate >/= 6 degrees to be able to ambulate on level surface with more normalized gait without increase of pain MET  3) Pt will be able to navigate stairs with step over step pattern without pain.  Jennifer Torres 10 be accomplished in 20-24 weeks:  1)  Pt will be able to squat to the ground without deviation and even distribution of weight. 2) Pt will be able to perform jumping motion without valgus collapse of knee durign concentric and eccentric phases   3) Pt will be able to run >/= 1 mile without pain. 4) Pt will be able to perform lateral cutting movements without pain or instability.   5) Pt will be able to demonstrate rotational control with jumping movements >/= to contralateral limb.   . .     PLAN  [x]  Upgrade activities as tolerated     [x]  Continue plan of care  [x]  Update interventions per flow sheet       []  Discharge due to:_  []  Other:_      Joni Brooke, PT, DPT 9/6/2019

## 2019-09-09 ENCOUNTER — APPOINTMENT (OUTPATIENT)
Dept: PHYSICAL THERAPY | Age: 34
End: 2019-09-09
Payer: COMMERCIAL

## 2019-09-13 ENCOUNTER — HOSPITAL ENCOUNTER (OUTPATIENT)
Dept: PHYSICAL THERAPY | Age: 34
Discharge: HOME | End: 2019-09-13
Payer: COMMERCIAL

## 2019-09-13 PROCEDURE — 97112 NEUROMUSCULAR REEDUCATION: CPT | Performed by: PHYSICAL THERAPIST

## 2019-09-13 PROCEDURE — 97140 MANUAL THERAPY 1/> REGIONS: CPT | Performed by: PHYSICAL THERAPIST

## 2019-09-13 PROCEDURE — 97110 THERAPEUTIC EXERCISES: CPT | Performed by: PHYSICAL THERAPIST

## 2019-09-13 PROCEDURE — 97016 VASOPNEUMATIC DEVICE THERAPY: CPT | Performed by: PHYSICAL THERAPIST

## 2019-09-13 NOTE — PROGRESS NOTES
Candelario Garcia PT DAILY TREATMENT NOTE - [facility] 2-15    Patient Name: Aury White  Date:2019  : 1985  [x]  Patient  Verified  Payor: BLUE MUNA / Plan: Juan Miguel Zheng [id number] PPO / Product Type: PPO /    In time: 810a Out time: 925a  Total Treatment Time (min): 75  Total Timed Codes (min): 60  1:1 Treatment Time ( only): 47  Visit #:  18    Treatment Area: Left ankle pain [M25.572]    SUBJECTIVE  Pain Level (0-10 scale): 0/10  Any medication changes, allergies to medications, adverse drug reactions, diagnosis change, or new procedure performed?: [x] No    [] Yes (see summary sheet for update)  Subjective functional status/changes:   [] No changes reported  Patient reports he has been dealing with a sinus infection this week so he has not done much.     OBJECTIVE    Modality rationale: decrease inflammation and decrease pain to improve the patients ability to perform ADLs and reduce pain levels   Min Type Additional Details       [] Estim: []Att   []Unatt    []TENS instruct                  []IFC  []Premod   []NMES                     []Other:  []w/US   []w/ice   []w/heat  Position:  Location:       []  Traction: [] Cervical       []Lumbar                       [] Prone          []Supine                       []Intermittent   []Continuous Lbs:  [] before manual  [] after manual  []w/heat    []  Ultrasound: []Continuous   [] Pulsed                       at: []1MHz   []3MHz Location:  W/cm2:    [] Paraffin         Location:   []w/heat    []  Ice     []  Heat  []  Ice massage Position:  Location:    []  Laser  []  Other: Position:  Location:     15 [x]  Vasopneumatic Device Pressure:       [] lo [x] med [] hi   Temperature: 34     [x] Skin assessment post-treatment:  [x]intact []redness- no adverse reaction    []redness  adverse reaction:     35 min Therapeutic Exercise:  [x] See flow sheet :   Rationale: increase ROM and increase strength to improve the patients ability to perform ADLs and reduce pain levels    15 min Neuromuscular Re-education:  [x]  See flow sheet : wobble board ankle plantarflexion with cueing for isolated movement balancing body weight over ankle; manual resistance isokinetic resistance ankle dorsiflexion/plantarflexion and inversion/eversion   Rationale: increase ROM, increase strength, improve coordination, improve balance and increase proprioception  to improve the patients ability to maintain dynamic balance with activity        10 min Manual Therapy:  STM/MFR incision and gastroc; A-P talocrural mobs grade 3-4   Rationale: decrease pain, increase ROM and increase tissue extensibility  to improve the patients ability to perform ADLs and reduce pain levels                         With   [] TE   [] TA   [] neuro   [] other: Patient Education: [x] Review HEP    [] Progressed/Changed HEP based on:   [] positioning   [] body mechanics   [] transfers   [] heat/ice application    [] other:      Other Objective/Functional Measures: none noted     Pain Level (0-10 scale) post treatment: 0/10    ASSESSMENT/Changes in Function:   Advanced to heel raise dropping below level surface with weight distributed 50% and added fitter board striding/lunging movement. Patient will continue to benefit from skilled PT services to modify and progress therapeutic interventions, address functional mobility deficits, address ROM deficits, address strength deficits, analyze and address soft tissue restrictions, analyze and cue movement patterns, analyze and modify body mechanics/ergonomics and assess and modify postural abnormalities to attain remaining goals. []  See Plan of Care  []  See progress note/recertification  []  See Discharge Summary         Progress towards goals / Updated goals:  Short Term Goals: To be accomplished in 3-4 weeks:  1)  Pt will be independent with HEP.  MET  2) Pt will demonstrate >/= 6 degrees to be able to ambulate on level surface with more normalized gait without increase of pain MET  3) Pt will be able to navigate stairs with step over step pattern without pain. MET      Long Term Goals: To be accomplished in 20-24 weeks:  1)  Pt will be able to squat to the ground without deviation and even distribution of weight. 2) Pt will be able to perform jumping motion without valgus collapse of knee durign concentric and eccentric phases   3) Pt will be able to run >/= 1 mile without pain. 4) Pt will be able to perform lateral cutting movements without pain or instability.   5) Pt will be able to demonstrate rotational control with jumping movements >/= to contralateral limb.   . .     PLAN  [x]  Upgrade activities as tolerated     [x]  Continue plan of care  [x]  Update interventions per flow sheet       []  Discharge due to:_  []  Other:_      Ally Joseph, PT, DPT 9/13/2019

## 2019-09-17 ENCOUNTER — HOSPITAL ENCOUNTER (OUTPATIENT)
Dept: PHYSICAL THERAPY | Age: 34
Discharge: HOME | End: 2019-09-17
Payer: COMMERCIAL

## 2019-09-17 PROCEDURE — 97112 NEUROMUSCULAR REEDUCATION: CPT | Performed by: PHYSICAL THERAPIST

## 2019-09-17 PROCEDURE — 97110 THERAPEUTIC EXERCISES: CPT | Performed by: PHYSICAL THERAPIST

## 2019-09-17 PROCEDURE — 97140 MANUAL THERAPY 1/> REGIONS: CPT | Performed by: PHYSICAL THERAPIST

## 2019-09-20 ENCOUNTER — HOSPITAL ENCOUNTER (OUTPATIENT)
Dept: PHYSICAL THERAPY | Age: 34
Discharge: HOME | End: 2019-09-20
Payer: COMMERCIAL

## 2019-09-20 PROCEDURE — 97140 MANUAL THERAPY 1/> REGIONS: CPT | Performed by: PHYSICAL THERAPIST

## 2019-09-20 PROCEDURE — 97110 THERAPEUTIC EXERCISES: CPT | Performed by: PHYSICAL THERAPIST

## 2019-09-20 PROCEDURE — 97112 NEUROMUSCULAR REEDUCATION: CPT | Performed by: PHYSICAL THERAPIST

## 2019-09-20 NOTE — PROGRESS NOTES
Daniel Rich PT DAILY TREATMENT NOTE - [facility] 2-15    Patient Name: Jonnie Bright  Date:2019  : 1985  [x]  Patient  Verified  Payor: Killian Locke / Plan: Juan Miguel Zheng 5747 PPO / Product Type: PPO /    In time: 80a Out time: 920a   Total Treatment Time (min): 74  Total Timed Codes (min): 74  1:1 Treatment Time ([facility] only): 56  Visit #:  20    Treatment Area: Left ankle pain [M25.572]    SUBJECTIVE  Pain Level (0-10 scale): 0/10  Any medication changes, allergies to medications, adverse drug reactions, diagnosis change, or new procedure performed?: [x] No    [] Yes (see summary sheet for update)  Subjective functional status/changes:   [] No changes reported  Patient reports that he does still feel a tightness.     OBJECTIVE    Modality rationale: decrease inflammation and decrease pain to improve the patients ability to perform ADLs and reduce pain levels   Min Type Additional Details       [] Estim: []Att   []Unatt    []TENS instruct                  []IFC  []Premod   []NMES                     []Other:  []w/US   []w/ice   []w/heat  Position:  Location:       []  Traction: [] Cervical       []Lumbar                       [] Prone          []Supine                       []Intermittent   []Continuous Lbs:  [] before manual  [] after manual  []w/heat    []  Ultrasound: []Continuous   [] Pulsed                       at: []1MHz   []3MHz Location:  W/cm2:    [] Paraffin         Location:   []w/heat   To go []  Ice     []  Heat  []  Ice massage Position:  Location:    []  Laser  []  Other: Position:  Location:      []  Vasopneumatic Device Pressure:       [] lo [] med [] hi   Temperature:      [x] Skin assessment post-treatment:  [x]intact []redness- no adverse reaction    []redness  adverse reaction:     45 min Therapeutic Exercise:  [x] See flow sheet :   Rationale: increase ROM and increase strength to improve the patients ability to perform ADLs and reduce pain levels    14 min Neuromuscular Re-education:  [x]  See flow sheet : wobble board ankle plantarflexion with cueing for isolated movement balancing body weight over ankle; manual resistance isokinetic resistance ankle dorsiflexion/plantarflexion and inversion/eversion   Rationale: increase ROM, increase strength, improve coordination, improve balance and increase proprioception  to improve the patients ability to maintain dynamic balance with activity        15 min Manual Therapy:  STM/MFR incision (lateral mobilization) and gastroc; A-P talocrural mobs grade 3-4   Rationale: decrease pain, increase ROM and increase tissue extensibility  to improve the patients ability to perform ADLs and reduce pain levels                         With   [] TE   [] TA   [] neuro   [] other: Patient Education: [x] Review HEP    [] Progressed/Changed HEP based on:   [] positioning   [] body mechanics   [] transfers   [] heat/ice application    [] other:      Other Objective/Functional Measures: none noted     Pain Level (0-10 scale) post treatment: 0/10    ASSESSMENT/Changes in Function:   Advanced with agility ladder today with slow speed. Patient will continue to benefit from skilled PT services to modify and progress therapeutic interventions, address functional mobility deficits, address ROM deficits, address strength deficits, analyze and address soft tissue restrictions, analyze and cue movement patterns, analyze and modify body mechanics/ergonomics and assess and modify postural abnormalities to attain remaining goals. []  See Plan of Care  []  See progress note/recertification  []  See Discharge Summary         Progress towards goals / Updated goals:  Short Term Goals: To be accomplished in 3-4 weeks:  1)  Pt will be independent with HEP. MET  2) Pt will demonstrate >/= 6 degrees to be able to ambulate on level surface with more normalized gait without increase of pain MET  3) Pt will be able to navigate stairs with step over step pattern without pain.  MET      Long Term Goals: To be accomplished in 20-24 weeks:  1)  Pt will be able to squat to the ground without deviation and even distribution of weight. 2) Pt will be able to perform jumping motion without valgus collapse of knee durign concentric and eccentric phases   3) Pt will be able to run >/= 1 mile without pain. 4) Pt will be able to perform lateral cutting movements without pain or instability.   5) Pt will be able to demonstrate rotational control with jumping movements >/= to contralateral limb.   . .     PLAN  [x]  Upgrade activities as tolerated     [x]  Continue plan of care  [x]  Update interventions per flow sheet       []  Discharge due to:_  []  Other:_      Moraima Buck, PT, DPT 9/20/2019

## 2019-09-24 ENCOUNTER — HOSPITAL ENCOUNTER (OUTPATIENT)
Dept: PHYSICAL THERAPY | Age: 34
Discharge: HOME | End: 2019-09-24
Payer: COMMERCIAL

## 2019-09-24 PROCEDURE — 97110 THERAPEUTIC EXERCISES: CPT | Performed by: PHYSICAL THERAPIST

## 2019-09-24 PROCEDURE — 97112 NEUROMUSCULAR REEDUCATION: CPT | Performed by: PHYSICAL THERAPIST

## 2019-09-24 PROCEDURE — 97140 MANUAL THERAPY 1/> REGIONS: CPT | Performed by: PHYSICAL THERAPIST

## 2019-09-24 NOTE — PROGRESS NOTES
Clemente Rolle PT DAILY TREATMENT NOTE - [facility] 2-15    Patient Name: Oneida Yates  Date:2019  : 1985  [x]  Patient  Verified  Payor: BLUE CROSS / Plan: Juan Miguel Zheng [id number] PPO / Product Type: PPO /    In time: 810a Out time: 925a   Total Treatment Time (min): 75  Total Timed Codes (min): 75  1:1 Treatment Time (MC only): 55  Visit #:  21    Treatment Area: Left ankle pain [M25.572]    SUBJECTIVE  Pain Level (0-10 scale): 0/10  Any medication changes, allergies to medications, adverse drug reactions, diagnosis change, or new procedure performed?: [x] No    [] Yes (see summary sheet for update)  Subjective functional status/changes:   [] No changes reported  Patient reports that he feels about the same.     OBJECTIVE    Modality rationale: decrease inflammation and decrease pain to improve the patients ability to perform ADLs and reduce pain levels   Min Type Additional Details       [] Estim: []Att   []Unatt    []TENS instruct                  []IFC  []Premod   []NMES                     []Other:  []w/US   []w/ice   []w/heat  Position:  Location:       []  Traction: [] Cervical       []Lumbar                       [] Prone          []Supine                       []Intermittent   []Continuous Lbs:  [] before manual  [] after manual  []w/heat    []  Ultrasound: []Continuous   [] Pulsed                       at: []1MHz   []3MHz Location:  W/cm2:    [] Paraffin         Location:   []w/heat   To go []  Ice     []  Heat  []  Ice massage Position:  Location:    []  Laser  []  Other: Position:  Location:      []  Vasopneumatic Device Pressure:       [] lo [] med [] hi   Temperature:      [x] Skin assessment post-treatment:  [x]intact []redness- no adverse reaction    []redness  adverse reaction:     45 min Therapeutic Exercise:  [x] See flow sheet :   Rationale: increase ROM and increase strength to improve the patients ability to perform ADLs and reduce pain levels    15 min Neuromuscular Re-education:  [x]  See flow sheet : wobble board ankle plantarflexion with cueing for isolated movement balancing body weight over ankle; manual resistance isokinetic resistance ankle dorsiflexion/plantarflexion and inversion/eversion   Rationale: increase ROM, increase strength, improve coordination, improve balance and increase proprioception  to improve the patients ability to maintain dynamic balance with activity        15 min Manual Therapy:  IASTM at achilles distal insertion and mid portion; A-P talocrural mobs grade 3-4   Rationale: decrease pain, increase ROM and increase tissue extensibility  to improve the patients ability to perform ADLs and reduce pain levels                         With   [] TE   [] TA   [] neuro   [] other: Patient Education: [x] Review HEP    [] Progressed/Changed HEP based on:   [] positioning   [] body mechanics   [] transfers   [] heat/ice application    [] other:      Other Objective/Functional Measures: none noted     Pain Level (0-10 scale) post treatment: 0/10    ASSESSMENT/Changes in Function:   Advanced with agility ladder today with faster speed. Patient will continue to benefit from skilled PT services to modify and progress therapeutic interventions, address functional mobility deficits, address ROM deficits, address strength deficits, analyze and address soft tissue restrictions, analyze and cue movement patterns, analyze and modify body mechanics/ergonomics and assess and modify postural abnormalities to attain remaining goals. []  See Plan of Care  []  See progress note/recertification  []  See Discharge Summary         Progress towards goals / Updated goals:  Short Term Goals: To be accomplished in 3-4 weeks:  1)  Pt will be independent with HEP. MET  2) Pt will demonstrate >/= 6 degrees to be able to ambulate on level surface with more normalized gait without increase of pain MET  3) Pt will be able to navigate stairs with step over step pattern without pain.  Amado Lind 8 Term Goals: To be accomplished in 20-24 weeks:  1)  Pt will be able to squat to the ground without deviation and even distribution of weight. 2) Pt will be able to perform jumping motion without valgus collapse of knee durign concentric and eccentric phases   3) Pt will be able to run >/= 1 mile without pain. 4) Pt will be able to perform lateral cutting movements without pain or instability.   5) Pt will be able to demonstrate rotational control with jumping movements >/= to contralateral limb.   . .     PLAN  [x]  Upgrade activities as tolerated     [x]  Continue plan of care  [x]  Update interventions per flow sheet       []  Discharge due to:_  []  Other:_      Amedeo Smoker, PT, DPT 9/24/2019

## 2019-09-27 ENCOUNTER — APPOINTMENT (OUTPATIENT)
Dept: PHYSICAL THERAPY | Age: 34
End: 2019-09-27
Payer: COMMERCIAL

## 2019-10-01 ENCOUNTER — HOSPITAL ENCOUNTER (OUTPATIENT)
Dept: PHYSICAL THERAPY | Age: 34
Discharge: HOME | End: 2019-10-01
Payer: COMMERCIAL

## 2019-10-01 PROCEDURE — 97140 MANUAL THERAPY 1/> REGIONS: CPT | Performed by: PHYSICAL THERAPIST

## 2019-10-01 PROCEDURE — 97110 THERAPEUTIC EXERCISES: CPT

## 2019-10-01 NOTE — PROGRESS NOTES
Jennifer Caputo PT DAILY TREATMENT NOTE - [facility] 2-15    Patient Name: Severa Barks  Date:10/1/2019  : 1985  [x]  Patient  Verified  Payor: Dalia Cervantes / Plan: Juan Miguel Zheng [id number] PPO / Product Type: PPO /    In time: 200a Out time: 935a  Total Treatment Time (min): 90  Total Timed Codes (min): 90  1:1 Treatment Time ( only): 75  Visit #:  22    Treatment Area: Left ankle pain [M25.572]    SUBJECTIVE  Pain Level (0-10 scale): 0/10  Any medication changes, allergies to medications, adverse drug reactions, diagnosis change, or new procedure performed?: [x] No    [] Yes (see summary sheet for update)  Subjective functional status/changes:   [] No changes reported  Patient reports that he tracked a suspect into the wood this weekend and did not have on tight fitting shoes and did notice his ankle fatiguing sooner than expected. He was able to do a light jog without pain for approx 60 ft. No increased pain after or next morning.       OBJECTIVE    Modality rationale: decrease inflammation and decrease pain to improve the patients ability to perform ADLs and reduce pain levels   Min Type Additional Details       [] Estim: []Att   []Unatt    []TENS instruct                  []IFC  []Premod   []NMES                     []Other:  []w/US   []w/ice   []w/heat  Position:  Location:       []  Traction: [] Cervical       []Lumbar                       [] Prone          []Supine                       []Intermittent   []Continuous Lbs:  [] before manual  [] after manual  []w/heat    []  Ultrasound: []Continuous   [] Pulsed                       at: []1MHz   []3MHz Location:  W/cm2:    [] Paraffin         Location:   []w/heat   To go []  Ice     []  Heat  []  Ice massage Position:  Location:    []  Laser  []  Other: Position:  Location:      []  Vasopneumatic Device Pressure:       [] lo [] med [] hi   Temperature:      [x] Skin assessment post-treatment:  [x]intact []redness- no adverse reaction    []redness  adverse reaction: 75 min Therapeutic Exercise:  [x] See flow sheet :   Rationale: increase ROM and increase strength to improve the patients ability to perform ADLs and reduce pain levels         15 min Manual Therapy:  IASTM at achilles distal insertion and mid portion; A-P talocrural mobs grade 3-4   Rationale: decrease pain, increase ROM and increase tissue extensibility  to improve the patients ability to perform ADLs and reduce pain levels                         With   [] TE   [] TA   [] neuro   [] other: Patient Education: [x] Review HEP    [] Progressed/Changed HEP based on:   [] positioning   [] body mechanics   [] transfers   [] heat/ice application    [] other:      Other Objective/Functional Measures: none noted     Pain Level (0-10 scale) post treatment: 0/10    ASSESSMENT/Changes in Function:   Advanced with slide board and total gym reduced gravity plyometric jumping double limb today. Patient will continue to benefit from skilled PT services to modify and progress therapeutic interventions, address functional mobility deficits, address ROM deficits, address strength deficits, analyze and address soft tissue restrictions, analyze and cue movement patterns, analyze and modify body mechanics/ergonomics and assess and modify postural abnormalities to attain remaining goals. []  See Plan of Care  []  See progress note/recertification  []  See Discharge Summary         Progress towards goals / Updated goals:  Short Term Goals: To be accomplished in 3-4 weeks:  1)  Pt will be independent with HEP. MET  2) Pt will demonstrate >/= 6 degrees to be able to ambulate on level surface with more normalized gait without increase of pain MET  3) Pt will be able to navigate stairs with step over step pattern without pain. MET      Long Term Goals: To be accomplished in 20-24 weeks:  1)  Pt will be able to squat to the ground without deviation and even distribution of weight.   2) Pt will be able to perform jumping motion without valgus collapse of knee durign concentric and eccentric phases   3) Pt will be able to run >/= 1 mile without pain. 4) Pt will be able to perform lateral cutting movements without pain or instability.   5) Pt will be able to demonstrate rotational control with jumping movements >/= to contralateral limb.   . .     PLAN  [x]  Upgrade activities as tolerated     [x]  Continue plan of care  [x]  Update interventions per flow sheet       []  Discharge due to:_  []  Other:_      Willie Padilla PT, DPT 10/1/2019

## 2019-10-04 ENCOUNTER — HOSPITAL ENCOUNTER (OUTPATIENT)
Dept: PHYSICAL THERAPY | Age: 34
Discharge: HOME | End: 2019-10-04
Payer: COMMERCIAL

## 2019-10-04 PROCEDURE — 97140 MANUAL THERAPY 1/> REGIONS: CPT | Performed by: PHYSICAL THERAPIST

## 2019-10-04 PROCEDURE — 97110 THERAPEUTIC EXERCISES: CPT | Performed by: PHYSICAL THERAPIST

## 2019-10-04 PROCEDURE — 97016 VASOPNEUMATIC DEVICE THERAPY: CPT | Performed by: PHYSICAL THERAPIST

## 2019-10-04 NOTE — PROGRESS NOTES
Carmen Ramesh PT DAILY TREATMENT NOTE - [facility] 2-15    Patient Name: Jerome Henriquez  Date:10/4/2019  : 1985  [x]  Patient  Verified  Payor: BLUE MUNA / Plan: Juan Miguel Zheng [id number] PPO / Product Type: PPO /    In time: 12a Out time: 1000a  Total Treatment Time (min): 110  Total Timed Codes (min): 95  1:1 Treatment Time ( only): 75  Visit #:  23    Treatment Area: Left ankle pain [M25.572]    SUBJECTIVE  Pain Level (0-10 scale): 0/10  Any medication changes, allergies to medications, adverse drug reactions, diagnosis change, or new procedure performed?: [x] No    [] Yes (see summary sheet for update)  Subjective functional status/changes:   [] No changes reported  Patient reports that he did have soreness in the calf muscle after last session.     OBJECTIVE    Modality rationale: decrease inflammation and decrease pain to improve the patients ability to perform ADLs and reduce pain levels   Min Type Additional Details       [] Estim: []Att   []Unatt    []TENS instruct                  []IFC  []Premod   []NMES                     []Other:  []w/US   []w/ice   []w/heat  Position:  Location:       []  Traction: [] Cervical       []Lumbar                       [] Prone          []Supine                       []Intermittent   []Continuous Lbs:  [] before manual  [] after manual  []w/heat    []  Ultrasound: []Continuous   [] Pulsed                       at: []1MHz   []3MHz Location:  W/cm2:    [] Paraffin         Location:   []w/heat    []  Ice     []  Heat  []  Ice massage Position:  Location:    []  Laser  []  Other: Position:  Location:   15   [x]  Vasopneumatic Device Pressure:       [] lo [] med [x] hi   Temperature: 34F     [x] Skin assessment post-treatment:  [x]intact []redness- no adverse reaction    []redness  adverse reaction:     80 min Therapeutic Exercise:  [x] See flow sheet :   Rationale: increase ROM and increase strength to improve the patients ability to perform ADLs and reduce pain levels         15 min Manual Therapy:  IASTM at achilles distal insertion and mid portion; A-P talocrural mobs grade 3-4   Rationale: decrease pain, increase ROM and increase tissue extensibility  to improve the patients ability to perform ADLs and reduce pain levels                         With   [] TE   [] TA   [] neuro   [] other: Patient Education: [x] Review HEP    [] Progressed/Changed HEP based on:   [] positioning   [] body mechanics   [] transfers   [] heat/ice application    [] other:      Other Objective/Functional Measures: none noted     Pain Level (0-10 scale) post treatment: 0/10    ASSESSMENT/Changes in Function:   Advanced to single limb hopping on total gym with gravity reduced. Increased intensity with agility ladder and added forward movement with stomping force in preparation for jumping load. Patient will continue to benefit from skilled PT services to modify and progress therapeutic interventions, address functional mobility deficits, address ROM deficits, address strength deficits, analyze and address soft tissue restrictions, analyze and cue movement patterns, analyze and modify body mechanics/ergonomics and assess and modify postural abnormalities to attain remaining goals. []  See Plan of Care  []  See progress note/recertification  []  See Discharge Summary         Progress towards goals / Updated goals:  Short Term Goals: To be accomplished in 3-4 weeks:  1)  Pt will be independent with HEP. MET  2) Pt will demonstrate >/= 6 degrees to be able to ambulate on level surface with more normalized gait without increase of pain MET  3) Pt will be able to navigate stairs with step over step pattern without pain. MET      Long Term Goals: To be accomplished in 20-24 weeks:  1)  Pt will be able to squat to the ground without deviation and even distribution of weight.   2) Pt will be able to perform jumping motion without valgus collapse of knee durign concentric and eccentric phases   3) Pt will be able to run >/= 1 mile without pain. 4) Pt will be able to perform lateral cutting movements without pain or instability.   5) Pt will be able to demonstrate rotational control with jumping movements >/= to contralateral limb.   . .     PLAN  [x]  Upgrade activities as tolerated     [x]  Continue plan of care  [x]  Update interventions per flow sheet       []  Discharge due to:_  []  Other:_      Frannie Pinto PT, DPT 10/4/2019

## 2019-10-07 ENCOUNTER — HOSPITAL ENCOUNTER (OUTPATIENT)
Dept: PHYSICAL THERAPY | Age: 34
Discharge: HOME | End: 2019-10-07
Payer: COMMERCIAL

## 2019-10-07 PROCEDURE — 97110 THERAPEUTIC EXERCISES: CPT | Performed by: PHYSICAL THERAPIST

## 2019-10-07 PROCEDURE — 97140 MANUAL THERAPY 1/> REGIONS: CPT | Performed by: PHYSICAL THERAPIST

## 2019-10-07 NOTE — PROGRESS NOTES
Micheal Trujillo PT DAILY TREATMENT NOTE - [facility] 2-15    Patient Name: Shalom Armstrong  Date:10/7/2019  : 1985  [x]  Patient  Verified  Payor: Dionte Dayana / Plan: Juan Miguel Zheng [id number] PPO / Product Type: PPO /    In time: 405p Out time: 510p  Total Treatment Time (min): 65  Total Timed Codes (min): 65  1:1 Treatment Time (MC only): 40  Visit #:  24    Treatment Area: Left ankle pain [M25.572]    SUBJECTIVE  Pain Level (0-10 scale): 0/10  Any medication changes, allergies to medications, adverse drug reactions, diagnosis change, or new procedure performed?: [x] No    [] Yes (see summary sheet for update)  Subjective functional status/changes:   [] No changes reported  Patient reports that he did have soreness in the calf muscle after last session.     OBJECTIVE    Modality rationale: decrease inflammation and decrease pain to improve the patients ability to perform ADLs and reduce pain levels   Min Type Additional Details       [] Estim: []Att   []Unatt    []TENS instruct                  []IFC  []Premod   []NMES                     []Other:  []w/US   []w/ice   []w/heat  Position:  Location:       []  Traction: [] Cervical       []Lumbar                       [] Prone          []Supine                       []Intermittent   []Continuous Lbs:  [] before manual  [] after manual  []w/heat    []  Ultrasound: []Continuous   [] Pulsed                       at: []1MHz   []3MHz Location:  W/cm2:    [] Paraffin         Location:   []w/heat   To go [x]  Ice     []  Heat  []  Ice massage Position:  Location:    []  Laser  []  Other: Position:  Location:      []  Vasopneumatic Device Pressure:       [] lo [] med [] hi   Temperature:      [x] Skin assessment post-treatment:  [x]intact []redness- no adverse reaction    []redness  adverse reaction:     55 min Therapeutic Exercise:  [x] See flow sheet :   Rationale: increase ROM and increase strength to improve the patients ability to perform ADLs and reduce pain levels         10 min Manual Therapy:  IASTM at achilles distal insertion and mid portion; A-P talocrural mobs grade 3-4   Rationale: decrease pain, increase ROM and increase tissue extensibility  to improve the patients ability to perform ADLs and reduce pain levels                         With   [] TE   [] TA   [] neuro   [] other: Patient Education: [x] Review HEP    [] Progressed/Changed HEP based on:   [] positioning   [] body mechanics   [] transfers   [] heat/ice application    [] other:      Other Objective/Functional Measures: none noted     Pain Level (0-10 scale) post treatment: 0/10    ASSESSMENT/Changes in Function:    at achilles insertion more lateral and anterior aspect of distal portion. Did not do slide board today to help reduce tissue irritation. Patient will continue to benefit from skilled PT services to modify and progress therapeutic interventions, address functional mobility deficits, address ROM deficits, address strength deficits, analyze and address soft tissue restrictions, analyze and cue movement patterns, analyze and modify body mechanics/ergonomics and assess and modify postural abnormalities to attain remaining goals. []  See Plan of Care  []  See progress note/recertification  []  See Discharge Summary         Progress towards goals / Updated goals:  Short Term Goals: To be accomplished in 3-4 weeks:  1)  Pt will be independent with HEP. MET  2) Pt will demonstrate >/= 6 degrees to be able to ambulate on level surface with more normalized gait without increase of pain MET  3) Pt will be able to navigate stairs with step over step pattern without pain. MET      Long Term Goals: To be accomplished in 20-24 weeks:  1)  Pt will be able to squat to the ground without deviation and even distribution of weight. 2) Pt will be able to perform jumping motion without valgus collapse of knee durign concentric and eccentric phases   3) Pt will be able to run >/= 1 mile without pain.   4) Pt will be able to perform lateral cutting movements without pain or instability.   5) Pt will be able to demonstrate rotational control with jumping movements >/= to contralateral limb.   . .     PLAN  [x]  Upgrade activities as tolerated     [x]  Continue plan of care  [x]  Update interventions per flow sheet       []  Discharge due to:_  []  Other:_      Jana Prado PT, DPT 10/7/2019

## 2019-10-09 ENCOUNTER — HOSPITAL ENCOUNTER (OUTPATIENT)
Dept: PHYSICAL THERAPY | Age: 34
Discharge: HOME | End: 2019-10-09
Payer: COMMERCIAL

## 2019-10-09 PROCEDURE — 97016 VASOPNEUMATIC DEVICE THERAPY: CPT

## 2019-10-09 PROCEDURE — 97140 MANUAL THERAPY 1/> REGIONS: CPT

## 2019-10-09 PROCEDURE — 97110 THERAPEUTIC EXERCISES: CPT

## 2019-10-09 NOTE — PROGRESS NOTES
Brown Aleman PT DAILY TREATMENT NOTE - [facility] -15    Patient Name: Tc Fung  Date:10/9/2019  : 1985  [x]  Patient  Verified  Payor: Tonio Orosco / Plan: Juan Miguel Zheng [id number] PPO / Product Type: PPO /    In time: 8:06A Out time: 9:21A  Total Treatment Time (min): 75  Total Timed Codes (min): 60  1:1 Treatment Time ( only): 40  Visit #:  25    Treatment Area: Left ankle pain [M25.572]    SUBJECTIVE  Pain Level (0-10 scale): 0/10  Any medication changes, allergies to medications, adverse drug reactions, diagnosis change, or new procedure performed?: [x] No    [] Yes (see summary sheet for update)  Subjective functional status/changes:   [] No changes reported  Pt reported soreness from last session.     OBJECTIVE    Modality rationale: decrease inflammation and decrease pain to improve the patients ability to perform ADLs and reduce pain levels   Min Type Additional Details       [] Estim: []Att   []Unatt    []TENS instruct                  []IFC  []Premod   []NMES                     []Other:  []w/US   []w/ice   []w/heat  Position:  Location:       []  Traction: [] Cervical       []Lumbar                       [] Prone          []Supine                       []Intermittent   []Continuous Lbs:  [] before manual  [] after manual  []w/heat    []  Ultrasound: []Continuous   [] Pulsed                       at: []1MHz   []3MHz Location:  W/cm2:    [] Paraffin         Location:   []w/heat    []  Ice     []  Heat  []  Ice massage Position:  Location:    []  Laser  []  Other: Position:  Location:   15   [x]  Vasopneumatic Device Pressure:       [] lo [] med [x] hi   Temperature: 34F     [x] Skin assessment post-treatment:  [x]intact []redness- no adverse reaction    []redness  adverse reaction:     40 min Therapeutic Exercise:  [x] See flow sheet :   Rationale: increase ROM and increase strength to improve the patients ability to perform ADLs and reduce pain levels    5 min Therapeutic Activities:  [x] See flow sheet : deceleration training with emphasis on stomping force in preparation for jumping load. Rationale: increase ROM and increase strength to improve the patients ability to perform ADLs and reduce pain levels       15 min Manual Therapy:  IASTM at achilles distal insertion and mid portion; A-P talocrural mobs grade 3-4   Rationale: decrease pain, increase ROM and increase tissue extensibility  to improve the patients ability to perform ADLs and reduce pain levels                         With   [] TE   [] TA   [] neuro   [] other: Patient Education: [x] Review HEP    [] Progressed/Changed HEP based on:   [] positioning   [] body mechanics   [] transfers   [] heat/ice application    [] other:      Other Objective/Functional Measures: --    Pain Level (0-10 scale) post treatment: 0/10    ASSESSMENT/Changes in Function:   Pt tolerated deceleration training well today. No increase in pain, did require cues on stomping force. Patient will continue to benefit from skilled PT services to modify and progress therapeutic interventions, address functional mobility deficits, address ROM deficits, address strength deficits, analyze and address soft tissue restrictions, analyze and cue movement patterns, analyze and modify body mechanics/ergonomics and assess and modify postural abnormalities to attain remaining goals. []  See Plan of Care  []  See progress note/recertification  []  See Discharge Summary         Progress towards goals / Updated goals:  Short Term Goals: To be accomplished in 3-4 weeks:  1)  Pt will be independent with HEP. MET  2) Pt will demonstrate >/= 6 degrees to be able to ambulate on level surface with more normalized gait without increase of pain MET  3) Pt will be able to navigate stairs with step over step pattern without pain. MET      Long Term Goals: To be accomplished in 20-24 weeks:  1)  Pt will be able to squat to the ground without deviation and even distribution of weight.   2) Pt will be able to perform jumping motion without valgus collapse of knee durign concentric and eccentric phases   3) Pt will be able to run >/= 1 mile without pain. 4) Pt will be able to perform lateral cutting movements without pain or instability.   5) Pt will be able to demonstrate rotational control with jumping movements >/= to contralateral limb.   . .     PLAN  [x]  Upgrade activities as tolerated     [x]  Continue plan of care  [x]  Update interventions per flow sheet       []  Discharge due to:_  []  Other:_      Fransisco Ramey PTA, OPT[facility] 10/9/2019

## 2019-10-16 ENCOUNTER — APPOINTMENT (OUTPATIENT)
Dept: PHYSICAL THERAPY | Age: 34
End: 2019-10-16
Payer: COMMERCIAL

## 2019-10-18 ENCOUNTER — HOSPITAL ENCOUNTER (OUTPATIENT)
Dept: PHYSICAL THERAPY | Age: 34
Discharge: HOME | End: 2019-10-18
Payer: COMMERCIAL

## 2019-10-18 PROCEDURE — 97140 MANUAL THERAPY 1/> REGIONS: CPT | Performed by: PHYSICAL THERAPIST

## 2019-10-18 PROCEDURE — 97110 THERAPEUTIC EXERCISES: CPT | Performed by: PHYSICAL THERAPIST

## 2019-10-18 PROCEDURE — 97016 VASOPNEUMATIC DEVICE THERAPY: CPT | Performed by: PHYSICAL THERAPIST

## 2019-10-18 NOTE — PROGRESS NOTES
Ilda Alfaro PT DAILY TREATMENT NOTE - [facility] 2-15    Patient Name: Hansa Niño  Date:10/18/2019  : 1985  [x]  Patient  Verified  Payor: Marcos Earing / Plan: Juan Miguel Zheng [id number] PPO / Product Type: PPO /    In time: 800a Out time: 930a  Total Treatment Time (min): 90  Total Timed Codes (min): 75  1:1 Treatment Time ( only): 54  Visit #:  26    Treatment Area: Left ankle pain [M25.572]    SUBJECTIVE  Pain Level (0-10 scale): 0/10  Any medication changes, allergies to medications, adverse drug reactions, diagnosis change, or new procedure performed?: [x] No    [] Yes (see summary sheet for update)  Subjective functional status/changes:   [] No changes reported  Pt reported he did a lot of walking while out [address] and did notice just a slight amount of soreness by the last day.      OBJECTIVE    Modality rationale: decrease inflammation and decrease pain to improve the patients ability to perform ADLs and reduce pain levels   Min Type Additional Details       [] Estim: []Att   []Unatt    []TENS instruct                  []IFC  []Premod   []NMES                     []Other:  []w/US   []w/ice   []w/heat  Position:  Location:       []  Traction: [] Cervical       []Lumbar                       [] Prone          []Supine                       []Intermittent   []Continuous Lbs:  [] before manual  [] after manual  []w/heat    []  Ultrasound: []Continuous   [] Pulsed                       at: []1MHz   []3MHz Location:  W/cm2:    [] Paraffin         Location:   []w/heat    []  Ice     []  Heat  []  Ice massage Position:  Location:    []  Laser  []  Other: Position:  Location:   10   [x]  Vasopneumatic Device Pressure:       [] lo [] med [x] hi   Temperature: 34F     [x] Skin assessment post-treatment:  [x]intact []redness- no adverse reaction    []redness  adverse reaction:     60 min Therapeutic Exercise:  [x] See flow sheet :   Rationale: increase ROM and increase strength to improve the patients ability to perform ADLs and reduce pain levels         15 min Manual Therapy:  STM achilles distal insertion and mid portion of lateral gastroc    Rationale: decrease pain, increase ROM and increase tissue extensibility  to improve the patients ability to perform ADLs and reduce pain levels                         With   [] TE   [] TA   [] neuro   [] other: Patient Education: [x] Review HEP    [] Progressed/Changed HEP based on:   [] positioning   [] body mechanics   [] transfers   [] heat/ice application    [] other:      Other Objective/Functional Measures: --    Pain Level (0-10 scale) post treatment: 0/10    ASSESSMENT/Changes in Function:   Did not advance with light jogging today but did do the Lateral X machine for warm up. Patient will continue to benefit from skilled PT services to modify and progress therapeutic interventions, address functional mobility deficits, address ROM deficits, address strength deficits, analyze and address soft tissue restrictions, analyze and cue movement patterns, analyze and modify body mechanics/ergonomics and assess and modify postural abnormalities to attain remaining goals. []  See Plan of Care  []  See progress note/recertification  []  See Discharge Summary         Progress towards goals / Updated goals:  Short Term Goals: To be accomplished in 3-4 weeks:  1)  Pt will be independent with HEP. MET  2) Pt will demonstrate >/= 6 degrees to be able to ambulate on level surface with more normalized gait without increase of pain MET  3) Pt will be able to navigate stairs with step over step pattern without pain. MET      Long Term Goals: To be accomplished in 20-24 weeks:  1)  Pt will be able to squat to the ground without deviation and even distribution of weight. 2) Pt will be able to perform jumping motion without valgus collapse of knee durign concentric and eccentric phases   3) Pt will be able to run >/= 1 mile without pain.   4) Pt will be able to perform lateral cutting movements without pain or instability.   5) Pt will be able to demonstrate rotational control with jumping movements >/= to contralateral limb.   . .     PLAN  [x]  Upgrade activities as tolerated     [x]  Continue plan of care  [x]  Update interventions per flow sheet       []  Discharge due to:_  []  Other:_      Wayne Bright, PT, DPT, PTA, OPT[facility] 10/18/2019

## 2019-10-22 ENCOUNTER — HOSPITAL ENCOUNTER (OUTPATIENT)
Dept: PHYSICAL THERAPY | Age: 34
Discharge: HOME | End: 2019-10-22
Payer: COMMERCIAL

## 2019-10-22 PROCEDURE — 97140 MANUAL THERAPY 1/> REGIONS: CPT | Performed by: PHYSICAL THERAPIST

## 2019-10-22 PROCEDURE — 97110 THERAPEUTIC EXERCISES: CPT | Performed by: PHYSICAL THERAPIST

## 2019-10-22 NOTE — PROGRESS NOTES
Martin Boles PT DAILY TREATMENT NOTE - [facility] 2-15    Patient Name: Tila Friend  Date:10/22/2019  : 1985  [x]  Patient  Verified  Payor: Tatum Jones / Plan: Juan Miguel Zheng 5747 PPO / Product Type: PPO /    In time: 815a Out time: 940a  Total Treatment Time (min): 85  Total Timed Codes (min): 85  1:1 Treatment Time ( only): 45  Visit #:  27    Treatment Area: Left ankle pain [M25.572]    SUBJECTIVE  Pain Level (0-10 scale): 0/10  Any medication changes, allergies to medications, adverse drug reactions, diagnosis change, or new procedure performed?: [x] No    [] Yes (see summary sheet for update)  Subjective functional status/changes:   [] No changes reported  Pt reported he is not anymore sore than usual today. OBJECTIVE      70 min Therapeutic Exercise:  [x] See flow sheet :   Rationale: increase ROM and increase strength to improve the patients ability to perform ADLs and reduce pain levels         15 min Manual Therapy:  STM achilles distal insertion and mid portion of lateral gastroc    Rationale: decrease pain, increase ROM and increase tissue extensibility  to improve the patients ability to perform ADLs and reduce pain levels                         With   [] TE   [] TA   [] neuro   [] other: Patient Education: [x] Review HEP    [] Progressed/Changed HEP based on:   [] positioning   [] body mechanics   [] transfers   [] heat/ice application    [] other:      Other Objective/Functional Measures: --    Pain Level (0-10 scale) post treatment: 0/10    ASSESSMENT/Changes in Function:   Difficulty with walking lunges being able to keep weight on left LE  With tendency to shift to the right. Pt is apprehensive about applying full force through achilles on left.   Patient will continue to benefit from skilled PT services to modify and progress therapeutic interventions, address functional mobility deficits, address ROM deficits, address strength deficits, analyze and address soft tissue restrictions, analyze and cue movement patterns, analyze and modify body mechanics/ergonomics and assess and modify postural abnormalities to attain remaining goals. []  See Plan of Care  []  See progress note/recertification  []  See Discharge Summary         Progress towards goals / Updated goals:  Short Term Goals: To be accomplished in 3-4 weeks:  1)  Pt will be independent with HEP. MET  2) Pt will demonstrate >/= 6 degrees to be able to ambulate on level surface with more normalized gait without increase of pain MET  3) Pt will be able to navigate stairs with step over step pattern without pain. MET      Long Term Goals: To be accomplished in 20-24 weeks:  1)  Pt will be able to squat to the ground without deviation and even distribution of weight. 2) Pt will be able to perform jumping motion without valgus collapse of knee durign concentric and eccentric phases   3) Pt will be able to run >/= 1 mile without pain. 4) Pt will be able to perform lateral cutting movements without pain or instability.   5) Pt will be able to demonstrate rotational control with jumping movements >/= to contralateral limb.   . .     PLAN  [x]  Upgrade activities as tolerated     [x]  Continue plan of care  [x]  Update interventions per flow sheet       []  Discharge due to:_  []  Other:_      Nory Jose, PT, DPT, 10/22/2019

## 2019-10-25 ENCOUNTER — HOSPITAL ENCOUNTER (OUTPATIENT)
Dept: PHYSICAL THERAPY | Age: 34
Discharge: HOME | End: 2019-10-25
Payer: COMMERCIAL

## 2019-10-25 PROCEDURE — 97016 VASOPNEUMATIC DEVICE THERAPY: CPT | Performed by: PHYSICAL THERAPIST

## 2019-10-25 PROCEDURE — 97110 THERAPEUTIC EXERCISES: CPT | Performed by: PHYSICAL THERAPIST

## 2019-10-25 PROCEDURE — 97140 MANUAL THERAPY 1/> REGIONS: CPT | Performed by: PHYSICAL THERAPIST

## 2019-10-25 NOTE — PROGRESS NOTES
Pat Rose PT DAILY TREATMENT NOTE - [facility] 2-15    Patient Name: Wes Ford  Date:10/25/2019  : 1985  [x]  Patient  Verified  Payor: Shivam López / Plan: Juan Miguel Zheng 5747 PPO / Product Type: PPO /    In time: 233H Out time: 915a  Total Treatment Time (min): 80  Total Timed Codes (min): 70  1:1 Treatment Time ( only): 58  Visit #:  28    Treatment Area: Left ankle pain [M25.572]    SUBJECTIVE  Pain Level (0-10 scale): 0/10  Any medication changes, allergies to medications, adverse drug reactions, diagnosis change, or new procedure performed?: [x] No    [] Yes (see summary sheet for update)  Subjective functional status/changes:   [] No changes reported  Pt reports that his ankle is sore from the last session.     OBJECTIVE    Modality rationale: decrease inflammation and decrease pain to improve the patients ability to perform ADLs and reduce pain levels   Min Type Additional Details       [] Estim: []Att   []Unatt    []TENS instruct                  []IFC  []Premod   []NMES                     []Other:  []w/US   []w/ice   []w/heat  Position:  Location:       []  Traction: [] Cervical       []Lumbar                       [] Prone          []Supine                       []Intermittent   []Continuous Lbs:  [] before manual  [] after manual  []w/heat    []  Ultrasound: []Continuous   [] Pulsed                       at: []1MHz   []3MHz Location:  W/cm2:    [] Paraffin         Location:   []w/heat    []  Ice     []  Heat  []  Ice massage Position:  Location:    []  Laser  []  Other: Position:  Location:   10   [x]  Vasopneumatic Device Pressure:       [] lo [] med [x] hi   Temperature: 34F     [x] Skin assessment post-treatment:  [x]intact []redness- no adverse reaction    []redness  adverse reaction:     55 min Therapeutic Exercise:  [x] See flow sheet :   Rationale: increase ROM and increase strength to improve the patients ability to perform ADLs and reduce pain levels         15 min Manual Therapy:  STM achilles distal insertion and mid portion of lateral gastroc    Rationale: decrease pain, increase ROM and increase tissue extensibility  to improve the patients ability to perform ADLs and reduce pain levels                         With   [] TE   [] TA   [] neuro   [] other: Patient Education: [x] Review HEP    [] Progressed/Changed HEP based on:   [] positioning   [] body mechanics   [] transfers   [] heat/ice application    [] other:      Other Objective/Functional Measures: --    Pain Level (0-10 scale) post treatment: 0/10    ASSESSMENT/Changes in Function:   Extensive time spent on ability to tolerate weight through left foot during lunges vs shifting weight to right  Foot due to weakness in left. Able to begin ankle plantarflexion with hopping form R to L and L to R. Patient will continue to benefit from skilled PT services to modify and progress therapeutic interventions, address functional mobility deficits, address ROM deficits, address strength deficits, analyze and address soft tissue restrictions, analyze and cue movement patterns, analyze and modify body mechanics/ergonomics and assess and modify postural abnormalities to attain remaining goals. []  See Plan of Care  []  See progress note/recertification  []  See Discharge Summary         Progress towards goals / Updated goals:  Short Term Goals: To be accomplished in 3-4 weeks:  1)  Pt will be independent with HEP. MET  2) Pt will demonstrate >/= 6 degrees to be able to ambulate on level surface with more normalized gait without increase of pain MET  3) Pt will be able to navigate stairs with step over step pattern without pain. MET      Long Term Goals: To be accomplished in 20-24 weeks:  1)  Pt will be able to squat to the ground without deviation and even distribution of weight.   2) Pt will be able to perform jumping motion without valgus collapse of knee durign concentric and eccentric phases   3) Pt will be able to run >/= 1 mile without pain. 4) Pt will be able to perform lateral cutting movements without pain or instability.   5) Pt will be able to demonstrate rotational control with jumping movements >/= to contralateral limb.   . .     PLAN  [x]  Upgrade activities as tolerated     [x]  Continue plan of care  [x]  Update interventions per flow sheet       []  Discharge due to:_  []  Other:_      Natanael Bal, PT, DPT,  10/25/2019

## 2019-10-26 ENCOUNTER — IP HISTORICAL/CONVERTED ENCOUNTER (OUTPATIENT)
Dept: OTHER | Age: 34
End: 2019-10-26

## 2019-10-29 ENCOUNTER — APPOINTMENT (OUTPATIENT)
Dept: PHYSICAL THERAPY | Age: 34
End: 2019-10-29
Payer: COMMERCIAL

## 2019-11-01 ENCOUNTER — APPOINTMENT (OUTPATIENT)
Dept: PHYSICAL THERAPY | Age: 34
End: 2019-11-01
Payer: COMMERCIAL

## 2019-11-05 ENCOUNTER — HOSPITAL ENCOUNTER (OUTPATIENT)
Dept: PHYSICAL THERAPY | Age: 34
Discharge: HOME | End: 2019-11-05
Payer: COMMERCIAL

## 2019-11-05 PROCEDURE — 97140 MANUAL THERAPY 1/> REGIONS: CPT | Performed by: PHYSICAL THERAPIST

## 2019-11-05 PROCEDURE — 97110 THERAPEUTIC EXERCISES: CPT | Performed by: PHYSICAL THERAPIST

## 2019-11-05 NOTE — PROGRESS NOTES
Carmen Ramesh PT DAILY TREATMENT NOTE - [facility] 2-15    Patient Name: Jerome Henriquez  Date:2019  : 1985  [x]  Patient  Verified  Payor: BLUE CROSS / Plan: Juan Miguel Zheng 5747 PPO / Product Type: PPO /    In time: 800a Out time: 905a  Total Treatment Time (min): 65  Total Timed Codes (min): 65  1:1 Treatment Time ( only): 45  Visit #:  29    Treatment Area: Left ankle pain [M25.572]    SUBJECTIVE  Pain Level (0-10 scale): 0/10  Any medication changes, allergies to medications, adverse drug reactions, diagnosis change, or new procedure performed?: [x] No    [] Yes (see summary sheet for update)  Subjective functional status/changes:   [] No changes reported  Pt reports that he was admitted into hospital after going to the ED for rhabdomyolitis. He has been resting for the last week and hydrating as well. He reports that he lifted heavy a week ago with his arms and then began to notice arm pain and dark colored urine. Today he feels like he is getting his arm strength back. He is waiting for his final confirmation that his CK levels have normalized.     OBJECTIVE    Modality rationale: decrease inflammation and decrease pain to improve the patients ability to perform ADLs and reduce pain levels   Min Type Additional Details       [] Estim: []Att   []Unatt    []TENS instruct                  []IFC  []Premod   []NMES                     []Other:  []w/US   []w/ice   []w/heat  Position:  Location:       []  Traction: [] Cervical       []Lumbar                       [] Prone          []Supine                       []Intermittent   []Continuous Lbs:  [] before manual  [] after manual  []w/heat    []  Ultrasound: []Continuous   [] Pulsed                       at: []1MHz   []3MHz Location:  W/cm2:    [] Paraffin         Location:   []w/heat   To go []  Ice     []  Heat  []  Ice massage Position:  Location:    []  Laser  []  Other: Position:  Location:      []  Vasopneumatic Device Pressure:       [] lo [] med [] hi Temperature:      [x] Skin assessment post-treatment:  [x]intact []redness- no adverse reaction    []redness  adverse reaction:     50 min Therapeutic Exercise:  [x] See flow sheet :   Rationale: increase ROM and increase strength to improve the patients ability to perform ADLs and reduce pain levels         15 min Manual Therapy:  STM achilles distal insertion and mid portion of lateral gastroc, 1st MTP dorsal mob and passive extension stretching. Rationale: decrease pain, increase ROM and increase tissue extensibility  to improve the patients ability to perform ADLs and reduce pain levels                         With   [] TE   [] TA   [] neuro   [] other: Patient Education: [x] Review HEP    [] Progressed/Changed HEP based on:   [] positioning   [] body mechanics   [] transfers   [] heat/ice application    [] other:      Other Objective/Functional Measures: --1st MTP Ext PROM 82 Dorsal joint line pain; dorsal MTP glide hypomobility    Pain Level (0-10 scale) post treatment: 0/10    ASSESSMENT/Changes in Function:   Resumed light activity today without aggravation of symptoms. Began 1st MTP extension stretching to reduce pain at 1st MTP with lunges. Instructed in 1st MTP Extension stretching for HEP. Patient will continue to benefit from skilled PT services to modify and progress therapeutic interventions, address functional mobility deficits, address ROM deficits, address strength deficits, analyze and address soft tissue restrictions, analyze and cue movement patterns, analyze and modify body mechanics/ergonomics and assess and modify postural abnormalities to attain remaining goals. []  See Plan of Care  []  See progress note/recertification  []  See Discharge Summary         Progress towards goals / Updated goals:  Short Term Goals: To be accomplished in 3-4 weeks:  1)  Pt will be independent with HEP.  MET  2) Pt will demonstrate >/= 6 degrees to be able to ambulate on level surface with more normalized gait without increase of pain MET  3) Pt will be able to navigate stairs with step over step pattern without pain. MET      Long Term Goals: To be accomplished in 20-24 weeks:  1)  Pt will be able to squat to the ground without deviation and even distribution of weight. 2) Pt will be able to perform jumping motion without valgus collapse of knee durign concentric and eccentric phases   3) Pt will be able to run >/= 1 mile without pain. 4) Pt will be able to perform lateral cutting movements without pain or instability.   5) Pt will be able to demonstrate rotational control with jumping movements >/= to contralateral limb.   . .     PLAN  [x]  Upgrade activities as tolerated     [x]  Continue plan of care  [x]  Update interventions per flow sheet       []  Discharge due to:_  []  Other:_      Natanael Bal, PT, DPT,  11/5/2019

## 2019-11-08 ENCOUNTER — HOSPITAL ENCOUNTER (OUTPATIENT)
Dept: PHYSICAL THERAPY | Age: 34
Discharge: HOME | End: 2019-11-08
Payer: COMMERCIAL

## 2019-11-08 PROCEDURE — 97110 THERAPEUTIC EXERCISES: CPT | Performed by: PHYSICAL THERAPIST

## 2019-11-08 PROCEDURE — 97140 MANUAL THERAPY 1/> REGIONS: CPT | Performed by: PHYSICAL THERAPIST

## 2019-11-08 NOTE — PROGRESS NOTES
Chasidy Cronin PT DAILY TREATMENT NOTE - [facility] 2-15    Patient Name: Cindy Lipscomb  Date:2019  : 1985  [x]  Patient  Verified  Payor: Calos Sandoval / Plan: Juan Miguel Zheng 5747 PPO / Product Type: PPO /    In time: 835a Out time: 955a  Total Treatment Time (min): 80  Total Timed Codes (min): 80  1:1 Treatment Time ( only): 45  Visit #:  30    Treatment Area: Left ankle pain [M25.572]    SUBJECTIVE  Pain Level (0-10 scale): 0/10  Any medication changes, allergies to medications, adverse drug reactions, diagnosis change, or new procedure performed?: [x] No    [] Yes (see summary sheet for update)  Subjective functional status/changes:   [] No changes reported  Pt reports that he feels fine today. He has not heard back from his PCP regarding his latest CK levels.     OBJECTIVE    Modality rationale: decrease inflammation and decrease pain to improve the patients ability to perform ADLs and reduce pain levels   Min Type Additional Details       [] Estim: []Att   []Unatt    []TENS instruct                  []IFC  []Premod   []NMES                     []Other:  []w/US   []w/ice   []w/heat  Position:  Location:       []  Traction: [] Cervical       []Lumbar                       [] Prone          []Supine                       []Intermittent   []Continuous Lbs:  [] before manual  [] after manual  []w/heat    []  Ultrasound: []Continuous   [] Pulsed                       at: []1MHz   []3MHz Location:  W/cm2:    [] Paraffin         Location:   []w/heat   To go []  Ice     []  Heat  []  Ice massage Position:  Location:    []  Laser  []  Other: Position:  Location:      []  Vasopneumatic Device Pressure:       [] lo [] med [] hi   Temperature:      [x] Skin assessment post-treatment:  [x]intact []redness- no adverse reaction    []redness  adverse reaction:     65 min Therapeutic Exercise:  [x] See flow sheet :   Rationale: increase ROM and increase strength to improve the patients ability to perform ADLs and reduce pain levels         15 min Manual Therapy:  STM achilles distal insertion and mid portion of lateral gastroc, 1st MTP dorsal mob grade 3-4 and passive extension stretching. Rationale: decrease pain, increase ROM and increase tissue extensibility  to improve the patients ability to perform ADLs and reduce pain levels                         With   [] TE   [] TA   [] neuro   [] other: Patient Education: [x] Review HEP    [] Progressed/Changed HEP based on:   [] positioning   [] body mechanics   [] transfers   [] heat/ice application    [] other:      Other Objective/Functional Measures: --1st MTP Ext PROM 90 Dorsal joint line pain;     Pain Level (0-10 scale) post treatment: 0/10    ASSESSMENT/Changes in Function:   1st MTP extension is improving. Advanced with treadmill jogging x 3 minutes and light jump rope activity. Patient will continue to benefit from skilled PT services to modify and progress therapeutic interventions, address functional mobility deficits, address ROM deficits, address strength deficits, analyze and address soft tissue restrictions, analyze and cue movement patterns, analyze and modify body mechanics/ergonomics and assess and modify postural abnormalities to attain remaining goals. []  See Plan of Care  []  See progress note/recertification  []  See Discharge Summary         Progress towards goals / Updated goals:  Short Term Goals: To be accomplished in 3-4 weeks:  1)  Pt will be independent with HEP. MET  2) Pt will demonstrate >/= 6 degrees to be able to ambulate on level surface with more normalized gait without increase of pain MET  3) Pt will be able to navigate stairs with step over step pattern without pain. MET      Long Term Goals: To be accomplished in 20-24 weeks:  1)  Pt will be able to squat to the ground without deviation and even distribution of weight.   2) Pt will be able to perform jumping motion without valgus collapse of knee durign concentric and eccentric phases   3) Pt will be able to run >/= 1 mile without pain. 4) Pt will be able to perform lateral cutting movements without pain or instability.   5) Pt will be able to demonstrate rotational control with jumping movements >/= to contralateral limb.   . .     PLAN  [x]  Upgrade activities as tolerated     [x]  Continue plan of care  [x]  Update interventions per flow sheet       []  Discharge due to:_  []  Other:_      Willie Padilla PT, DPT,  11/8/2019

## 2019-11-11 ENCOUNTER — HOSPITAL ENCOUNTER (OUTPATIENT)
Dept: PHYSICAL THERAPY | Age: 34
Discharge: HOME | End: 2019-11-11
Payer: COMMERCIAL

## 2019-11-11 PROCEDURE — 97140 MANUAL THERAPY 1/> REGIONS: CPT

## 2019-11-11 PROCEDURE — 97110 THERAPEUTIC EXERCISES: CPT

## 2019-11-11 NOTE — PROGRESS NOTES
Karishma Garcia PT DAILY TREATMENT NOTE - [facility] 2-15    Patient Name: Dian Cat  Date:2019  : 1985  [x]  Patient  Verified  Payor: Clint Butt / Plan: Juan Miguel Zheng [id number] PPO / Product Type: PPO /    In time: 8:04A Out time: 9:12A  Total Treatment Time (min): 68  Total Timed Codes (min): 68  1:1 Treatment Time ( only): 30  Visit #:  31    Treatment Area: Left ankle pain [M25.572]    SUBJECTIVE  Pain Level (0-10 scale): 0/10  Any medication changes, allergies to medications, adverse drug reactions, diagnosis change, or new procedure performed?: [x] No    [] Yes (see summary sheet for update)  Subjective functional status/changes:   [] No changes reported  Pt reported more cramping in his calf on Friday night. Did not stretch before going to sleep. Still waiting results for current CK levels.      OBJECTIVE    Modality rationale: decrease inflammation and decrease pain to improve the patients ability to perform ADLs and reduce pain levels   Min Type Additional Details       [] Estim: []Att   []Unatt    []TENS instruct                  []IFC  []Premod   []NMES                     []Other:  []w/US   []w/ice   []w/heat  Position:  Location:       []  Traction: [] Cervical       []Lumbar                       [] Prone          []Supine                       []Intermittent   []Continuous Lbs:  [] before manual  [] after manual  []w/heat    []  Ultrasound: []Continuous   [] Pulsed                       at: []1MHz   []3MHz Location:  W/cm2:    [] Paraffin         Location:   []w/heat   To go []  Ice     []  Heat  []  Ice massage Position:  Location:    []  Laser  []  Other: Position:  Location:      []  Vasopneumatic Device Pressure:       [] lo [] med [] hi   Temperature:      [x] Skin assessment post-treatment:  [x]intact []redness- no adverse reaction    []redness  adverse reaction:     53 min Therapeutic Exercise:  [x] See flow sheet :   Rationale: increase ROM and increase strength to improve the patients ability to perform ADLs and reduce pain levels         15 min Manual Therapy:  STM achilles distal insertion and mid portion of lateral gastroc, 1st MTP dorsal mob grade 3-4 and passive extension stretching. Rationale: decrease pain, increase ROM and increase tissue extensibility  to improve the patients ability to perform ADLs and reduce pain levels                         With   [] TE   [] TA   [] neuro   [] other: Patient Education: [x] Review HEP    [] Progressed/Changed HEP based on:   [] positioning   [] body mechanics   [] transfers   [] heat/ice application    [] other:      Other Objective/Functional Measures: --    Pain Level (0-10 scale) post treatment: 0/10    ASSESSMENT/Changes in Function:   Pt tolerated session well today. Still challenged with agility activities. Reports continued weakness no pain. Patient will continue to benefit from skilled PT services to modify and progress therapeutic interventions, address functional mobility deficits, address ROM deficits, address strength deficits, analyze and address soft tissue restrictions, analyze and cue movement patterns, analyze and modify body mechanics/ergonomics and assess and modify postural abnormalities to attain remaining goals. []  See Plan of Care  []  See progress note/recertification  []  See Discharge Summary         Progress towards goals / Updated goals:  Short Term Goals: To be accomplished in 3-4 weeks:  1)  Pt will be independent with HEP. MET  2) Pt will demonstrate >/= 6 degrees to be able to ambulate on level surface with more normalized gait without increase of pain MET  3) Pt will be able to navigate stairs with step over step pattern without pain. MET      Long Term Goals: To be accomplished in 20-24 weeks:  1)  Pt will be able to squat to the ground without deviation and even distribution of weight.   2) Pt will be able to perform jumping motion without valgus collapse of knee durign concentric and eccentric phases   3) Pt will be able to run >/= 1 mile without pain. 4) Pt will be able to perform lateral cutting movements without pain or instability.   5) Pt will be able to demonstrate rotational control with jumping movements >/= to contralateral limb.   . .     PLAN  [x]  Upgrade activities as tolerated     [x]  Continue plan of care  [x]  Update interventions per flow sheet       []  Discharge due to:_  []  Other:_      Kathy Sky PTA, OPTA  11/11/2019

## 2019-11-14 ENCOUNTER — HOSPITAL ENCOUNTER (OUTPATIENT)
Dept: PHYSICAL THERAPY | Age: 34
Discharge: HOME | End: 2019-11-14
Payer: COMMERCIAL

## 2019-11-14 ENCOUNTER — APPOINTMENT (OUTPATIENT)
Dept: PHYSICAL THERAPY | Age: 34
End: 2019-11-14
Payer: COMMERCIAL

## 2019-11-14 PROCEDURE — 97140 MANUAL THERAPY 1/> REGIONS: CPT

## 2019-11-14 PROCEDURE — 97110 THERAPEUTIC EXERCISES: CPT

## 2019-11-14 PROCEDURE — 97016 VASOPNEUMATIC DEVICE THERAPY: CPT

## 2019-11-14 NOTE — PROGRESS NOTES
Hoda Spencer PT DAILY TREATMENT NOTE - [facility] -15    Patient Name: Janey Orosco  Date:2019  : 1985  [x]  Patient  Verified  Payor: Loura Kussmaul / Plan: Juan Miguel Zheng [id number] PPO / Product Type: PPO /    In time: 3:00P Out time: 4:20P  Total Treatment Time (min): 80  Total Timed Codes (min): 65  1:1 Treatment Time ( only): 30  Visit #:  32    Treatment Area: Left ankle pain [M25.572]    SUBJECTIVE  Pain Level (0-10 scale): 0/10  Any medication changes, allergies to medications, adverse drug reactions, diagnosis change, or new procedure performed?: [x] No    [] Yes (see summary sheet for update)  Subjective functional status/changes:   [] No changes reported  Pt reported more pain in Achilles after last session. Thinks that maybe he was wearing the wrong shoes. The pain lasted for a day or two then got better. Pt reported icing to help with the pain which seemed wot work well.      OBJECTIVE    Modality rationale: decrease inflammation and decrease pain to improve the patients ability to perform ADLs and reduce pain levels   Min Type Additional Details       [] Estim: []Att   []Unatt    []TENS instruct                  []IFC  []Premod   []NMES                     []Other:  []w/US   []w/ice   []w/heat  Position:  Location:       []  Traction: [] Cervical       []Lumbar                       [] Prone          []Supine                       []Intermittent   []Continuous Lbs:  [] before manual  [] after manual  []w/heat    []  Ultrasound: []Continuous   [] Pulsed                       at: []1MHz   []3MHz Location:  W/cm2:    [] Paraffin         Location:   []w/heat   To go []  Ice     []  Heat  []  Ice massage Position:  Location:    []  Laser  []  Other: Position:  Location:      []  Vasopneumatic Device Pressure:       [] lo [] med [] hi   Temperature:      [x] Skin assessment post-treatment:  [x]intact []redness- no adverse reaction    []redness  adverse reaction:     50 min Therapeutic Exercise:  [x] See flow sheet :   Rationale: increase ROM and increase strength to improve the patients ability to perform ADLs and reduce pain levels      15 min Manual Therapy:  STM achilles distal insertion and mid portion of lateral gastroc, 1st MTP dorsal mob grade 3-4 and passive extension stretching. Rationale: decrease pain, increase ROM and increase tissue extensibility  to improve the patients ability to perform ADLs and reduce pain levels                         With   [] TE   [] TA   [] neuro   [] other: Patient Education: [x] Review HEP    [] Progressed/Changed HEP based on:   [] positioning   [] body mechanics   [] transfers   [] heat/ice application    [] other:      Other Objective/Functional Measures: --    Pain Level (0-10 scale) post treatment: 0/10    ASSESSMENT/Changes in Function:   Pt challenged with VMO touchbacks requirng havey cuing on proper form for eccentric lowering. Patient will continue to benefit from skilled PT services to modify and progress therapeutic interventions, address functional mobility deficits, address ROM deficits, address strength deficits, analyze and address soft tissue restrictions, analyze and cue movement patterns, analyze and modify body mechanics/ergonomics and assess and modify postural abnormalities to attain remaining goals. []  See Plan of Care  []  See progress note/recertification  []  See Discharge Summary         Progress towards goals / Updated goals:  Short Term Goals: To be accomplished in 3-4 weeks:  1)  Pt will be independent with HEP. MET  2) Pt will demonstrate >/= 6 degrees to be able to ambulate on level surface with more normalized gait without increase of pain MET  3) Pt will be able to navigate stairs with step over step pattern without pain. MET      Long Term Goals: To be accomplished in 20-24 weeks:  1)  Pt will be able to squat to the ground without deviation and even distribution of weight.   2) Pt will be able to perform jumping motion without valgus collapse of knee durign concentric and eccentric phases   3) Pt will be able to run >/= 1 mile without pain. 4) Pt will be able to perform lateral cutting movements without pain or instability.   5) Pt will be able to demonstrate rotational control with jumping movements >/= to contralateral limb.   . .     PLAN  [x]  Upgrade activities as tolerated     [x]  Continue plan of care  [x]  Update interventions per flow sheet       []  Discharge due to:_  []  Other:_      Thea Schneider PTA, OPTA  11/14/2019

## 2019-11-15 ENCOUNTER — APPOINTMENT (OUTPATIENT)
Dept: PHYSICAL THERAPY | Age: 34
End: 2019-11-15
Payer: COMMERCIAL

## 2019-11-19 ENCOUNTER — HOSPITAL ENCOUNTER (OUTPATIENT)
Dept: PHYSICAL THERAPY | Age: 34
Discharge: HOME | End: 2019-11-19
Payer: COMMERCIAL

## 2019-11-19 PROCEDURE — 97110 THERAPEUTIC EXERCISES: CPT

## 2019-11-19 PROCEDURE — 97140 MANUAL THERAPY 1/> REGIONS: CPT

## 2019-11-19 NOTE — PROGRESS NOTES
Leydi Lopez PT DAILY TREATMENT NOTE - [facility] 2-15    Patient Name: Wu Fagan  Date:2019  : 1985  [x]  Patient  Verified  Payor: Bang Ventura / Plan: Juan Miguel Zheng [id number] PPO / Product Type: PPO /    In time: 8:15A Out time: 9:18A  Total Treatment Time (min): 63  Total Timed Codes (min): 63  1:1 Treatment Time ( only): 30  Visit #:  33    Treatment Area: Left ankle pain [M25.572]    SUBJECTIVE  Pain Level (0-10 scale): 0/10  Any medication changes, allergies to medications, adverse drug reactions, diagnosis change, or new procedure performed?: [x] No    [] Yes (see summary sheet for update)  Subjective functional status/changes:   [] No changes reported  Pt continues to report tenderness along the L heel from his flare up/. He reports it is getting better but he still feels it.     OBJECTIVE    Modality rationale: decrease inflammation and decrease pain to improve the patients ability to perform ADLs and reduce pain levels   Min Type Additional Details       [] Estim: []Att   []Unatt    []TENS instruct                  []IFC  []Premod   []NMES                     []Other:  []w/US   []w/ice   []w/heat  Position:  Location:       []  Traction: [] Cervical       []Lumbar                       [] Prone          []Supine                       []Intermittent   []Continuous Lbs:  [] before manual  [] after manual  []w/heat    []  Ultrasound: []Continuous   [] Pulsed                       at: []1MHz   []3MHz Location:  W/cm2:    [] Paraffin         Location:   []w/heat   To go []  Ice     []  Heat  []  Ice massage Position:  Location:    []  Laser  []  Other: Position:  Location:      []  Vasopneumatic Device Pressure:       [] lo [] med [] hi   Temperature:      [x] Skin assessment post-treatment:  [x]intact []redness- no adverse reaction    []redness  adverse reaction:     58 min Therapeutic Exercise:  [x] See flow sheet :   Rationale: increase ROM and increase strength to improve the patients ability to perform ADLs and reduce pain levels      8 min Manual Therapy:  ISATM \"hook\" to achilles distal insertion and mid portion of lateral gastroc, 1st MTP dorsal mob grade 3-4 and passive extension stretching. Rationale: decrease pain, increase ROM and increase tissue extensibility  to improve the patients ability to perform ADLs and reduce pain levels                         With   [] TE   [] TA   [] neuro   [] other: Patient Education: [x] Review HEP    [] Progressed/Changed HEP based on:   [] positioning   [] body mechanics   [] transfers   [] heat/ice application    [] other:      Other Objective/Functional Measures: --    Pain Level (0-10 scale) post treatment: 0/10    ASSESSMENT/Changes in Function:   Pt challenged with positioning and form for bar squats. Required heavy cues for maintian proper form throughout exercise. No increase in pain. Pt tolerated additional 5 min running with very little discomfort in heel. May consider manual work first next session to see if discomfort can be eliminated completely. Patient will continue to benefit from skilled PT services to modify and progress therapeutic interventions, address functional mobility deficits, address ROM deficits, address strength deficits, analyze and address soft tissue restrictions, analyze and cue movement patterns, analyze and modify body mechanics/ergonomics and assess and modify postural abnormalities to attain remaining goals. []  See Plan of Care  []  See progress note/recertification  []  See Discharge Summary         Progress towards goals / Updated goals:  Short Term Goals: To be accomplished in 3-4 weeks:  1)  Pt will be independent with HEP. MET  2) Pt will demonstrate >/= 6 degrees to be able to ambulate on level surface with more normalized gait without increase of pain MET  3) Pt will be able to navigate stairs with step over step pattern without pain.  MET      Long Term Goals: To be accomplished in 20-24 weeks:  1)  Pt will be able to squat to the ground without deviation and even distribution of weight. 2) Pt will be able to perform jumping motion without valgus collapse of knee durign concentric and eccentric phases   3) Pt will be able to run >/= 1 mile without pain. 4) Pt will be able to perform lateral cutting movements without pain or instability.   5) Pt will be able to demonstrate rotational control with jumping movements >/= to contralateral limb.   . .     PLAN  [x]  Upgrade activities as tolerated     [x]  Continue plan of care  [x]  Update interventions per flow sheet       []  Discharge due to:_  []  Other:_      Bienvenido Davis PTA, OPTA  11/19/2019

## 2019-11-22 ENCOUNTER — HOSPITAL ENCOUNTER (OUTPATIENT)
Dept: PHYSICAL THERAPY | Age: 34
Discharge: HOME | End: 2019-11-22
Payer: COMMERCIAL

## 2019-11-22 PROCEDURE — 97110 THERAPEUTIC EXERCISES: CPT | Performed by: PHYSICAL THERAPIST

## 2019-11-22 PROCEDURE — 97140 MANUAL THERAPY 1/> REGIONS: CPT | Performed by: PHYSICAL THERAPIST

## 2019-11-22 NOTE — PROGRESS NOTES
Karishma Garcia PT DAILY TREATMENT NOTE - [facility] 2-15    Patient Name: Dian Cat  Date:2019  : 1985  [x]  Patient  Verified  Payor: BLUE CROSS / Plan: Juan Miguel Zheng 5747 PPO / Product Type: PPO /    In time: 200a  Out time: 920a  Total Treatment Time (min): 75  Total Timed Codes (min): 75  1:1 Treatment Time ( only): 30  Visit #:  34    Treatment Area: Left ankle pain [M25.572]    SUBJECTIVE  Pain Level (0-10 scale): 0/10  Any medication changes, allergies to medications, adverse drug reactions, diagnosis change, or new procedure performed?: [x] No    [] Yes (see summary sheet for update)  Subjective functional status/changes:   [] No changes  Pt reports that he is no longer feeling the soreness in his heel after wearing a tight pair of shoes last week. No residual pain after running last session.     OBJECTIVE    Modality rationale: decrease inflammation and decrease pain to improve the patients ability to perform ADLs and reduce pain levels   Min Type Additional Details       [] Estim: []Att   []Unatt    []TENS instruct                  []IFC  []Premod   []NMES                     []Other:  []w/US   []w/ice   []w/heat  Position:  Location:       []  Traction: [] Cervical       []Lumbar                       [] Prone          []Supine                       []Intermittent   []Continuous Lbs:  [] before manual  [] after manual  []w/heat    []  Ultrasound: []Continuous   [] Pulsed                       at: []1MHz   []3MHz Location:  W/cm2:    [] Paraffin         Location:   []w/heat   To go []  Ice     []  Heat  []  Ice massage Position:  Location:    []  Laser  []  Other: Position:  Location:      []  Vasopneumatic Device Pressure:       [] lo [] med [] hi   Temperature:      [x] Skin assessment post-treatment:  [x]intact []redness- no adverse reaction    []redness  adverse reaction:     67 min Therapeutic Exercise:  [x] See flow sheet :   Rationale: increase ROM and increase strength to improve the patients ability to perform ADLs and reduce pain levels      8 min Manual Therapy:  STM achilles distal insertion and mid portion of lateral gastroc, 1st MTP dorsal mob grade 3-4 and passive extension stretching. Rationale: decrease pain, increase ROM and increase tissue extensibility  to improve the patients ability to perform ADLs and reduce pain levels                         With   [] TE   [] TA   [] neuro   [] other: Patient Education: [x] Review HEP    [] Progressed/Changed HEP based on:   [] positioning   [] body mechanics   [] transfers   [] heat/ice application    [] other:      Other Objective/Functional Measures: --    Pain Level (0-10 scale) post treatment: 0/10    ASSESSMENT/Changes in Function:   Mo pain with treadmill. Does feel fatigue in gastroc with heel raises and jumping activities. Patient will continue to benefit from skilled PT services to modify and progress therapeutic interventions, address functional mobility deficits, address ROM deficits, address strength deficits, analyze and address soft tissue restrictions, analyze and cue movement patterns, analyze and modify body mechanics/ergonomics and assess and modify postural abnormalities to attain remaining goals. []  See Plan of Care  []  See progress note/recertification  []  See Discharge Summary         Progress towards goals / Updated goals:  Short Term Goals: To be accomplished in 3-4 weeks:  1)  Pt will be independent with HEP. MET  2) Pt will demonstrate >/= 6 degrees to be able to ambulate on level surface with more normalized gait without increase of pain MET  3) Pt will be able to navigate stairs with step over step pattern without pain. MET      Long Term Goals: To be accomplished in 20-24 weeks:  1)  Pt will be able to squat to the ground without deviation and even distribution of weight.  MET  2) Pt will be able to perform jumping motion without valgus collapse of knee durign concentric and eccentric phases   3) Pt will be able to run >/= 1 mile without pain. 4) Pt will be able to perform lateral cutting movements without pain or instability.   5) Pt will be able to demonstrate rotational control with jumping movements >/= to contralateral limb.   . .     PLAN  [x]  Upgrade activities as tolerated     [x]  Continue plan of care  [x]  Update interventions per flow sheet       []  Discharge due to:_  []  Other:_      Yamilex Juarez, PT, DPT,  11/22/2019

## 2019-11-26 ENCOUNTER — HOSPITAL ENCOUNTER (OUTPATIENT)
Dept: PHYSICAL THERAPY | Age: 34
Discharge: HOME | End: 2019-11-26
Payer: COMMERCIAL

## 2019-11-26 PROCEDURE — 97140 MANUAL THERAPY 1/> REGIONS: CPT | Performed by: PHYSICAL THERAPIST

## 2019-11-26 PROCEDURE — 97110 THERAPEUTIC EXERCISES: CPT | Performed by: PHYSICAL THERAPIST

## 2019-11-26 NOTE — PROGRESS NOTES
Micheal Trujillo PT DAILY TREATMENT NOTE - [facility] 2-15    Patient Name: Shalom Armstrong  Date:2019  : 1985  [x]  Patient  Verified  Payor: Dionte Anne / Plan: Juan Miguel Zheng [id number] PPO / Product Type: PPO /    In time: 18a Out time: 930a  Total Treatment Time (min): 70  Total Timed Codes (min): 70  1:1 Treatment Time ( only): 40  Visit #:  35    Treatment Area: Left ankle pain [M25.572]    SUBJECTIVE  Pain Level (0-10 scale): 0/10  Any medication changes, allergies to medications, adverse drug reactions, diagnosis change, or new procedure performed?: [x] No    [] Yes (see summary sheet for update)  Subjective functional status/changes:   [] No changes  Pt reports that he is no longer feeling the soreness in his heel after wearing a tight pair of shoes last week. No residual pain after running last session. OBJECTIVE        60 min Therapeutic Exercise:  [x] See flow sheet :   Rationale: increase ROM and increase strength to improve the patients ability to perform ADLs and reduce pain levels      10 min Manual Therapy:  STM achilles distal insertion and mid portion of lateral gastroc, 1st MTP dorsal mob grade 3-4 and passive extension stretching. Rationale: decrease pain, increase ROM and increase tissue extensibility  to improve the patients ability to perform ADLs and reduce pain levels                         With   [] TE   [] TA   [] neuro   [] other: Patient Education: [x] Review HEP    [] Progressed/Changed HEP based on:   [] positioning   [] body mechanics   [] transfers   [] heat/ice application    [] other:      Other Objective/Functional Measures: --    Pain Level (0-10 scale) post treatment: 0/10    ASSESSMENT/Changes in Function:   Still apprehensive about eccentric loading of achilles with single limb hopping front to back.   Patient will continue to benefit from skilled PT services to modify and progress therapeutic interventions, address functional mobility deficits, address ROM deficits, address strength deficits, analyze and address soft tissue restrictions, analyze and cue movement patterns, analyze and modify body mechanics/ergonomics and assess and modify postural abnormalities to attain remaining goals. []  See Plan of Care  []  See progress note/recertification  []  See Discharge Summary         Progress towards goals / Updated goals:  Short Term Goals: To be accomplished in 3-4 weeks:  1)  Pt will be independent with HEP. MET  2) Pt will demonstrate >/= 6 degrees to be able to ambulate on level surface with more normalized gait without increase of pain MET  3) Pt will be able to navigate stairs with step over step pattern without pain. MET      Long Term Goals: To be accomplished in 20-24 weeks:  1)  Pt will be able to squat to the ground without deviation and even distribution of weight. MET  2) Pt will be able to perform jumping motion without valgus collapse of knee durign concentric and eccentric phases   3) Pt will be able to run >/= 1 mile without pain. 4) Pt will be able to perform lateral cutting movements without pain or instability.   5) Pt will be able to demonstrate rotational control with jumping movements >/= to contralateral limb.   . .     PLAN  [x]  Upgrade activities as tolerated     [x]  Continue plan of care  [x]  Update interventions per flow sheet       []  Discharge due to:_  []  Other:_      Haydee Funez, PT, DPT,  11/26/2019

## 2019-12-03 ENCOUNTER — HOSPITAL ENCOUNTER (OUTPATIENT)
Dept: PHYSICAL THERAPY | Age: 34
Discharge: HOME | End: 2019-12-03
Payer: COMMERCIAL

## 2019-12-03 PROCEDURE — 97016 VASOPNEUMATIC DEVICE THERAPY: CPT | Performed by: PHYSICAL THERAPIST

## 2019-12-03 PROCEDURE — 97140 MANUAL THERAPY 1/> REGIONS: CPT | Performed by: PHYSICAL THERAPIST

## 2019-12-03 PROCEDURE — 97110 THERAPEUTIC EXERCISES: CPT | Performed by: PHYSICAL THERAPIST

## 2019-12-03 NOTE — PROGRESS NOTES
PT DAILY TREATMENT NOTE - [facility] 2-15    Patient Name: Mary Harvey  Date:12/3/2019  : 1985  [x]  Patient  Verified  Payor: BLUE CROSS / Plan: Juan Miguel Zheng [id number] PPO / Product Type: PPO /    In time:730a  Out time:858a  Total Treatment Time (min): 88  Total Timed Codes (min): 70  1:1 Treatment Time ( only): 38   Visit #:  36    Treatment Area: Left ankle pain [M25.572]    SUBJECTIVE  Pain Level (0-10 scale): 0  Any medication changes, allergies to medications, adverse drug reactions, diagnosis change, or new procedure performed?: [x] No    [] Yes (see summary sheet for update)  Subjective functional status/changes:   [] No changes reported  Doing pretty well overall. A little more stiff than average this morning, but not too bad. OBJECTIVE    Modality rationale: decrease edema, decrease inflammation and decrease pain to improve the patients ability to run without pain   Min Type Additional Details       [] Estim: []Att   []Unatt    []TENS instruct                  []IFC  []Premod   []NMES                     []Other:  []w/US   []w/ice   []w/heat  Position:  Location:       []  Traction: [] Cervical       []Lumbar                       [] Prone          []Supine                       []Intermittent   []Continuous Lbs:  [] before manual  [] after manual  []w/heat    []  Ultrasound: []Continuous   [] Pulsed                       at: []1MHz   []3MHz Location:  W/cm2:    [] Paraffin         Location:   []w/heat    []  Ice     []  Heat  []  Ice massage Position:  Location:    []  Laser  []  Other: Position:  Location:      []  Vasopneumatic Device Pressure:       [] lo [] med [] hi   Temperature:      [x] Skin assessment post-treatment:  [x]intact []redness- no adverse reaction    []redness  adverse reaction:     60 min Therapeutic Exercise:  [x]?  See flow sheet :   Rationale: increase ROM and increase strength to improve the patients ability to perform ADLs and reduce pain levels     10 min Manual Therapy:  STM achilles distal insertion and mid portion of lateral gastroc, 1st MTP dorsal mob grade 3-4 and passive extension stretching. Rationale: decrease pain, increase ROM and increase tissue extensibility  to improve the patients ability to perform ADLs and reduce pain levels     With   []? TE   []? TA   []? neuro   []? other: Patient Education: [x]? Review HEP    []? Progressed/Changed HEP based on:   []? positioning   []? body mechanics   []? transfers   []? heat/ice application    []? other:       Other Objective/Functional Measures: --     Pain Level (0-10 scale) post treatment: 0/10     ASSESSMENT/Changes in Function:   Did better with hopping drills today and eccentric loading. Added in soleus eccentric HR off step to improve eccentric load tolerance. Patient will continue to benefit from skilled PT services to modify and progress therapeutic interventions, address functional mobility deficits, address ROM deficits, address strength deficits, analyze and address soft tissue restrictions, analyze and cue movement patterns, analyze and modify body mechanics/ergonomics and assess and modify postural abnormalities to attain remaining goals. []? See Plan of Care  []? See progress note/recertification  []? See Discharge Summary         Progress towards goals / Updated goals:  Short Term Goals: To be accomplished in 3-4 weeks:  1)  Pt will be independent with HEP. MET  2) Pt will demonstrate >/= 6 degrees to be able to ambulate on level surface with more normalized gait without increase of pain MET  3) Pt will be able to navigate stairs with step over step pattern without pain. MET      Long Term Goals: To be accomplished in 20-24 weeks:  1)  Pt will be able to squat to the ground without deviation and even distribution of weight.  MET  2) Pt will be able to perform jumping motion without valgus collapse of knee durign concentric and eccentric phases   3) Pt will be able to run >/= 1 mile without pain. 4) Pt will be able to perform lateral cutting movements without pain or instability. 5) Pt will be able to demonstrate rotational control with jumping movements >/= to contralateral limb.   . .      PLAN  [x]? Upgrade activities as tolerated     [x]? Continue plan of care  [x]? Update interventions per flow sheet       []? Discharge due to:_  []?   Other:_        Gurdeep Agee, PT 12/3/2019

## 2019-12-06 ENCOUNTER — HOSPITAL ENCOUNTER (OUTPATIENT)
Dept: PHYSICAL THERAPY | Age: 34
Discharge: HOME | End: 2019-12-06
Payer: COMMERCIAL

## 2019-12-06 PROCEDURE — 97140 MANUAL THERAPY 1/> REGIONS: CPT | Performed by: PHYSICAL THERAPIST

## 2019-12-06 PROCEDURE — 97110 THERAPEUTIC EXERCISES: CPT | Performed by: PHYSICAL THERAPIST

## 2019-12-06 NOTE — PROGRESS NOTES
PT DAILY TREATMENT NOTE - [facility] 2-15    Patient Name: Tom Davidson  Date:2019  : 1985  [x]  Patient  Verified  Payor: BLUE CROSS / Plan: Juan Miguel Zheng [id number] PPO / Product Type: PPO /    In time: 200a  Out time: 920a  Total Treatment Time (min): 75  Total Timed Codes (min): 75  1:1 Treatment Time ( only): 25   Visit #: 40    Treatment Area: Left ankle pain [M25.572]    SUBJECTIVE  Pain Level (0-10 scale): 0  Any medication changes, allergies to medications, adverse drug reactions, diagnosis change, or new procedure performed?: [x] No    [] Yes (see summary sheet for update)  Subjective functional status/changes:   [] No changes reported  Lateral Achilles insertion area was a little sore after last visit, but otherwise doing alright. OBJECTIVE           Modality rationale: decrease edema, decrease inflammation and decrease pain to improve the patients ability to run without pain    Min Type Additional Details        []? Estim: []? Att   []? Unatt    []? TENS instruct                  []?IFC  []? Premod   []? NMES                     []?Other:  []?w/US   []?w/ice   []?w/heat  Position:  Location:        []? Traction: []? Cervical       []? Lumbar                       []? Prone          []? Supine                       []?Intermittent   []? Continuous Lbs:  []? before manual  []? after manual  []?w/heat     []? Ultrasound: []? Continuous   []? Pulsed                       at: []?1MHz   []? 3MHz Location:  W/cm2:     []? Paraffin         Location:   []?w/heat    to go [x]? Ice     []? Heat  []? Ice massage Position:  Location:     []? Laser  []? Other: Position:  Location:        []? Vasopneumatic Device Pressure:       []? lo []? med []? hi   Temperature:       [x]? Skin assessment post-treatment:  [x]? intact []? redness- no adverse reaction    []? redness  adverse reaction:      65 min Therapeutic Exercise:  [x]? ? See flow sheet :   Rationale: increase ROM and increase strength to improve the patients ability to perform ADLs and reduce pain levels     10 min Manual Therapy:  STM achilles distal insertion and mid portion of lateral gastroc, 1st MTP dorsal mob grade 3-4 and passive extension stretching. Rationale: decrease pain, increase ROM and increase tissue extensibility  to improve the patients ability to perform ADLs and reduce pain levels     With   []?? TE   []?? TA   []?? neuro   []?? other: Patient Education: [x]? ? Review HEP    []? ? Progressed/Changed HEP based on:   []?? positioning   []? ? body mechanics   []? ? transfers   []? ? heat/ice application    []? ? other:       Other Objective/Functional Measures: --     Pain Level (0-10 scale) post treatment: 0/10     ASSESSMENT/Changes in Function:   Progressing well with eccentric landing, only with minimal discomfort. Patient will continue to benefit from skilled PT services to modify and progress therapeutic interventions, address functional mobility deficits, address ROM deficits, address strength deficits, analyze and address soft tissue restrictions, analyze and cue movement patterns, analyze and modify body mechanics/ergonomics and assess and modify postural abnormalities to attain remaining goals.     []? ?  See Plan of Care  []? ?  See progress note/recertification  []? ?  See Discharge Summary     Progress towards goals / Updated goals:  Short Term Goals: To be accomplished in 3-4 weeks:  1)  Pt will be independent with HEP. MET  2) Pt will demonstrate >/= 6 degrees to be able to ambulate on level surface with more normalized gait without increase of pain MET  3) Pt will be able to navigate stairs with step over step pattern without pain. MET      Long Term Goals: To be accomplished in 20-24 weeks:  1)  Pt will be able to squat to the ground without deviation and even distribution of weight.  MET  2) Pt will be able to perform jumping motion without valgus collapse of knee durign concentric and eccentric phases   3) Pt will be able to run >/= 1 mile without pain. 4) Pt will be able to perform lateral cutting movements without pain or instability. 5) Pt will be able to demonstrate rotational control with jumping movements >/= to contralateral limb.   . .      PLAN  [x]? ?  Upgrade activities as tolerated     [x]? ?  Continue plan of care  [x]? ?  Update interventions per flow sheet       []? ?  Discharge due to:_  []??  Other:_         Sasha Altman, PT 12/6/2019

## 2019-12-11 ENCOUNTER — HOSPITAL ENCOUNTER (OUTPATIENT)
Dept: PHYSICAL THERAPY | Age: 34
Discharge: HOME | End: 2019-12-11
Payer: COMMERCIAL

## 2019-12-11 PROCEDURE — 97140 MANUAL THERAPY 1/> REGIONS: CPT | Performed by: PHYSICAL THERAPIST

## 2019-12-11 PROCEDURE — 97110 THERAPEUTIC EXERCISES: CPT | Performed by: PHYSICAL THERAPIST

## 2019-12-11 NOTE — PROGRESS NOTES
PT DAILY TREATMENT NOTE - [facility] 2-15    Patient Name: Xiang Nino  Date:2019  : 1985  [x]  Patient  Verified  Payor: Dc Jeannie / Plan: Juan Miguel Zheng [id number] PPO / Product Type: PPO /    In time: 810a  Out time: 920a  Total Treatment Time (min): 70  Total Timed Codes (min): 70  1:1 Treatment Time ( only): 40  Visit #: 45    Treatment Area: Left ankle pain [M25.572]    SUBJECTIVE  Pain Level (0-10 scale): 0  Any medication changes, allergies to medications, adverse drug reactions, diagnosis change, or new procedure performed?: [x] No    [] Yes (see summary sheet for update)  Subjective functional status/changes:   [] No changes reported  Pt reports that he feels like the \"push off for jumping is the hardest part right now. \" \" No pain limiting performance just not enough power/strength. \"    OBJECTIVE           Modality rationale: decrease edema, decrease inflammation and decrease pain to improve the patients ability to run without pain    Min Type Additional Details        []? Estim: []? Att   []? Unatt    []? TENS instruct                  []?IFC  []? Premod   []? NMES                     []?Other:  []?w/US   []?w/ice   []?w/heat  Position:  Location:        []? Traction: []? Cervical       []? Lumbar                       []? Prone          []? Supine                       []?Intermittent   []? Continuous Lbs:  []? before manual  []? after manual  []?w/heat     []? Ultrasound: []? Continuous   []? Pulsed                       at: []?1MHz   []? 3MHz Location:  W/cm2:     []? Paraffin         Location:   []?w/heat    to go [x]? Ice     []? Heat  []? Ice massage Position:  Location:     []? Laser  []? Other: Position:  Location:        []? Vasopneumatic Device Pressure:       []? lo []? med []? hi   Temperature:       [x]? Skin assessment post-treatment:  [x]? intact []? redness- no adverse reaction    []? redness  adverse reaction:      60 min Therapeutic Exercise:  [x]? ? See flow sheet : Rationale: increase ROM and increase strength to improve the patients ability to perform ADLs and reduce pain levels     10 min Manual Therapy:  STM achilles distal insertion and mid portion of lateral gastroc,    Rationale: decrease pain, increase ROM and increase tissue extensibility  to improve the patients ability to perform ADLs and reduce pain levels     With   []?? TE   []?? TA   []?? neuro   []?? other: Patient Education: [x]? ? Review HEP    []? ? Progressed/Changed HEP based on:   []?? positioning   []? ? body mechanics   []? ? transfers   []? ? heat/ice application    []? ? other:       Other Objective/Functional Measures: --     Pain Level (0-10 scale) post treatment: 0/10     ASSESSMENT/Changes in Function:   Still apprehensive about tolerating increased weight bearing on left LE with jumping activity. He is able to increased weight bearing tolerance with cueing. Patient will continue to benefit from skilled PT services to modify and progress therapeutic interventions, address functional mobility deficits, address ROM deficits, address strength deficits, analyze and address soft tissue restrictions, analyze and cue movement patterns, analyze and modify body mechanics/ergonomics and assess and modify postural abnormalities to attain remaining goals.     []? ?  See Plan of Care  []? ?  See progress note/recertification  []? ?  See Discharge Summary     Progress towards goals / Updated goals:  Short Term Goals: To be accomplished in 3-4 weeks:  1)  Pt will be independent with HEP. MET  2) Pt will demonstrate >/= 6 degrees to be able to ambulate on level surface with more normalized gait without increase of pain MET  3) Pt will be able to navigate stairs with step over step pattern without pain. MET      Long Term Goals: To be accomplished in 20-24 weeks:  1)  Pt will be able to squat to the ground without deviation and even distribution of weight.  MET  2) Pt will be able to perform jumping motion without valgus collapse of knee durign concentric and eccentric phases   3) Pt will be able to run >/= 1 mile without pain. 4) Pt will be able to perform lateral cutting movements without pain or instability. 5) Pt will be able to demonstrate rotational control with jumping movements >/= to contralateral limb.   . .      PLAN  [x]? ?  Upgrade activities as tolerated     [x]? ?  Continue plan of care  [x]? ?  Update interventions per flow sheet       []? ?  Discharge due to:_  []??  Other:_         Kiley Ortega, PT, DPT 12/11/2019

## 2019-12-13 ENCOUNTER — HOSPITAL ENCOUNTER (OUTPATIENT)
Dept: PHYSICAL THERAPY | Age: 34
Discharge: HOME | End: 2019-12-13
Payer: COMMERCIAL

## 2019-12-13 PROCEDURE — 97140 MANUAL THERAPY 1/> REGIONS: CPT | Performed by: PHYSICAL THERAPIST

## 2019-12-13 PROCEDURE — 97110 THERAPEUTIC EXERCISES: CPT | Performed by: PHYSICAL THERAPIST

## 2019-12-13 NOTE — PROGRESS NOTES
PT DAILY TREATMENT NOTE - [facility] 2-15    Patient Name: Tila Friend  Date:2019  : 1985  [x]  Patient  Verified  Payor: BLUE MUNA / Plan: Juan Miguel Zheng 5747 PPO / Product Type: PPO /    In time: 800a  Out time: 925a  Total Treatment Time (min): 85  Total Timed Codes (min): 85  1:1 Treatment Time ( only): 40  Visit #: 39    Treatment Area: Left ankle pain [M25.572]    SUBJECTIVE  Pain Level (0-10 scale): 0  Any medication changes, allergies to medications, adverse drug reactions, diagnosis change, or new procedure performed?: [x] No    [] Yes (see summary sheet for update)  Subjective functional status/changes:   [] No changes reported  Pt reports that he has his normal stiffness today. OBJECTIVE           Modality rationale: decrease edema, decrease inflammation and decrease pain to improve the patients ability to run without pain    Min Type Additional Details        []? Estim: []? Att   []? Unatt    []? TENS instruct                  []?IFC  []? Premod   []? NMES                     []?Other:  []?w/US   []?w/ice   []?w/heat  Position:  Location:        []? Traction: []? Cervical       []? Lumbar                       []? Prone          []? Supine                       []?Intermittent   []? Continuous Lbs:  []? before manual  []? after manual  []?w/heat     []? Ultrasound: []? Continuous   []? Pulsed                       at: []?1MHz   []? 3MHz Location:  W/cm2:     []? Paraffin         Location:   []?w/heat    to go [x]? Ice     []? Heat  []? Ice massage Position:  Location:     []? Laser  []? Other: Position:  Location:        []? Vasopneumatic Device Pressure:       []? lo []? med []? hi   Temperature:       [x]? Skin assessment post-treatment:  [x]? intact []? redness- no adverse reaction    []? redness  adverse reaction:      75 min Therapeutic Exercise:  [x]? ? See flow sheet :   Rationale: increase ROM and increase strength to improve the patients ability to perform ADLs and reduce pain levels     10 min Manual Therapy:  STM achilles distal insertion and mid portion of lateral gastroc,    Rationale: decrease pain, increase ROM and increase tissue extensibility  to improve the patients ability to perform ADLs and reduce pain levels     With   []?? TE   []?? TA   []?? neuro   []?? other: Patient Education: [x]? ? Review HEP    []? ? Progressed/Changed HEP based on:   []?? positioning   []? ? body mechanics   []? ? transfers   []? ? heat/ice application    []? ? other:       Other Objective/Functional Measures: --     Pain Level (0-10 scale) post treatment: 0/10     ASSESSMENT/Changes in Function:   Advanced with sport cord external perturbations with singlelimb hopping changing directions. Patient will continue to benefit from skilled PT services to modify and progress therapeutic interventions, address functional mobility deficits, address ROM deficits, address strength deficits, analyze and address soft tissue restrictions, analyze and cue movement patterns, analyze and modify body mechanics/ergonomics and assess and modify postural abnormalities to attain remaining goals.     []? ?  See Plan of Care  []? ?  See progress note/recertification  []? ?  See Discharge Summary     Progress towards goals / Updated goals:  Short Term Goals: To be accomplished in 3-4 weeks:  1)  Pt will be independent with HEP. MET  2) Pt will demonstrate >/= 6 degrees to be able to ambulate on level surface with more normalized gait without increase of pain MET  3) Pt will be able to navigate stairs with step over step pattern without pain. MET      Long Term Goals: To be accomplished in 20-24 weeks:  1)  Pt will be able to squat to the ground without deviation and even distribution of weight. MET  2) Pt will be able to perform jumping motion without valgus collapse of knee durign concentric and eccentric phases   3) Pt will be able to run >/= 1 mile without pain.   4) Pt will be able to perform lateral cutting movements without pain or instability. 5) Pt will be able to demonstrate rotational control with jumping movements >/= to contralateral limb.   . .      PLAN  [x]? ?  Upgrade activities as tolerated     [x]? ?  Continue plan of care  [x]? ?  Update interventions per flow sheet       []? ?  Discharge due to:_  []??  Other:_         Dominique Moran, PT, DPT 12/13/2019

## 2019-12-17 ENCOUNTER — APPOINTMENT (OUTPATIENT)
Dept: PHYSICAL THERAPY | Age: 34
End: 2019-12-17
Payer: COMMERCIAL

## 2019-12-20 ENCOUNTER — APPOINTMENT (OUTPATIENT)
Dept: PHYSICAL THERAPY | Age: 34
End: 2019-12-20
Payer: COMMERCIAL

## 2019-12-23 ENCOUNTER — HOSPITAL ENCOUNTER (OUTPATIENT)
Dept: PHYSICAL THERAPY | Age: 34
Discharge: HOME | End: 2019-12-23
Payer: COMMERCIAL

## 2019-12-23 PROCEDURE — 97110 THERAPEUTIC EXERCISES: CPT | Performed by: PHYSICAL THERAPIST

## 2019-12-23 PROCEDURE — 97140 MANUAL THERAPY 1/> REGIONS: CPT | Performed by: PHYSICAL THERAPIST

## 2019-12-23 PROCEDURE — 97530 THERAPEUTIC ACTIVITIES: CPT | Performed by: PHYSICAL THERAPIST

## 2019-12-23 NOTE — PROGRESS NOTES
PT DAILY TREATMENT NOTE - [facility] 2-15    Patient Name: Ivy Brooks  Date:2019  : 1985  [x]  Patient  Verified  Payor: Cherie Score / Plan: Juan Miguel Zheng 5747 PPO / Product Type: PPO /    In time: 400p Out time: 500p  Total Treatment Time (min): 60  Total Timed Codes (min): 60  1:1 Treatment Time ( only): 60  Visit #: 40    Treatment Area: Left ankle pain [M25.572]    SUBJECTIVE  Pain Level (0-10 scale): 0  Any medication changes, allergies to medications, adverse drug reactions, diagnosis change, or new procedure performed?: [x] No    [] Yes (see summary sheet for update)  Subjective functional status/changes:   [] No changes reported  Pt reports that he is about the same today. OBJECTIVE           Modality rationale: decrease edema, decrease inflammation and decrease pain to improve the patients ability to run without pain    Min Type Additional Details        []? Estim: []? Att   []? Unatt    []? TENS instruct                  []?IFC  []? Premod   []? NMES                     []?Other:  []?w/US   []?w/ice   []?w/heat  Position:  Location:        []? Traction: []? Cervical       []? Lumbar                       []? Prone          []? Supine                       []?Intermittent   []? Continuous Lbs:  []? before manual  []? after manual  []?w/heat     []? Ultrasound: []? Continuous   []? Pulsed                       at: []?1MHz   []? 3MHz Location:  W/cm2:     []? Paraffin         Location:   []?w/heat    to go [x]? Ice     []? Heat  []? Ice massage Position:  Location:     []? Laser  []? Other: Position:  Location:        []? Vasopneumatic Device Pressure:       []? lo []? med []? hi   Temperature:       [x]? Skin assessment post-treatment:  [x]? intact []? redness- no adverse reaction    []? redness  adverse reaction:      40 min Therapeutic Exercise:  [x]? ? See flow sheet :   Rationale: increase ROM and increase strength to improve the patients ability to perform ADLs and reduce pain levels    10 min Therapeutic Activity:  []  See flow sheet : prowler push with various pushoff techniques. Rationale: increase ROM, increase strength, improve coordination, improve balance and increase proprioception  to improve the patients ability to run       10 min Manual Therapy:  STM achilles distal insertion and mid portion of lateral gastroc,    Rationale: decrease pain, increase ROM and increase tissue extensibility  to improve the patients ability to perform ADLs and reduce pain levels     With   []?? TE   []?? TA   []?? neuro   []?? other: Patient Education: [x]? ? Review HEP    []? ? Progressed/Changed HEP based on:   []?? positioning   []? ? body mechanics   []? ? transfers   []? ? heat/ice application    []? ? other:       Other Objective/Functional Measures: --     Pain Level (0-10 scale) post treatment: 0/10     ASSESSMENT/Changes in Function:   Limited push off with prowler sled. No pain. Patient will continue to benefit from skilled PT services to modify and progress therapeutic interventions, address functional mobility deficits, address ROM deficits, address strength deficits, analyze and address soft tissue restrictions, analyze and cue movement patterns, analyze and modify body mechanics/ergonomics and assess and modify postural abnormalities to attain remaining goals.     []? ?  See Plan of Care  []? ?  See progress note/recertification  []? ?  See Discharge Summary     Progress towards goals / Updated goals:  Short Term Goals: To be accomplished in 3-4 weeks:  1)  Pt will be independent with HEP. MET  2) Pt will demonstrate >/= 6 degrees to be able to ambulate on level surface with more normalized gait without increase of pain MET  3) Pt will be able to navigate stairs with step over step pattern without pain. MET      Long Term Goals: To be accomplished in 20-24 weeks:  1)  Pt will be able to squat to the ground without deviation and even distribution of weight.  MET  2) Pt will be able to perform jumping motion without valgus collapse of knee durign concentric and eccentric phases   3) Pt will be able to run >/= 1 mile without pain. 4) Pt will be able to perform lateral cutting movements without pain or instability. 5) Pt will be able to demonstrate rotational control with jumping movements >/= to contralateral limb.   . .      PLAN  [x]? ?  Upgrade activities as tolerated     [x]? ?  Continue plan of care  [x]? ?  Update interventions per flow sheet       []? ?  Discharge due to:_  []??  Other:_         Lashonda Root, PT, DPT 12/23/2019

## 2019-12-27 ENCOUNTER — APPOINTMENT (OUTPATIENT)
Dept: PHYSICAL THERAPY | Age: 34
End: 2019-12-27
Payer: COMMERCIAL

## 2019-12-28 ENCOUNTER — ED HISTORICAL/CONVERTED ENCOUNTER (OUTPATIENT)
Dept: OTHER | Age: 34
End: 2019-12-28

## 2019-12-31 ENCOUNTER — HOSPITAL ENCOUNTER (OUTPATIENT)
Dept: PHYSICAL THERAPY | Age: 34
Discharge: HOME | End: 2019-12-31
Payer: COMMERCIAL

## 2019-12-31 PROCEDURE — 97110 THERAPEUTIC EXERCISES: CPT | Performed by: PHYSICAL THERAPIST

## 2019-12-31 PROCEDURE — 97140 MANUAL THERAPY 1/> REGIONS: CPT | Performed by: PHYSICAL THERAPIST

## 2019-12-31 PROCEDURE — 97530 THERAPEUTIC ACTIVITIES: CPT | Performed by: PHYSICAL THERAPIST

## 2019-12-31 NOTE — PROGRESS NOTES
PT DAILY TREATMENT NOTE - [facility] 2-15    Patient Name: Jarrett Villanueva  Date:2019  : 1985  [x]  Patient  Verified  Payor: Jessie Laws / Plan: Juan Miguel Zheng [id number] PPO / Product Type: PPO /    In time: 800a  Out time: 912a  Total Treatment Time (min): 62  Total Timed Codes (min): 62  1:1 Treatment Time ( only): 40  Visit #: 41    Treatment Area: Left ankle pain [M25.572]    SUBJECTIVE  Pain Level (0-10 scale): 0  Any medication changes, allergies to medications, adverse drug reactions, diagnosis change, or new procedure performed?: [x] No    [] Yes (see summary sheet for update)  Subjective functional status/changes:   [] No changes reported  Pt reports that he has his normal stiffness today. OBJECTIVE           Modality rationale: decrease edema, decrease inflammation and decrease pain to improve the patients ability to run without pain    Min Type Additional Details        []? Estim: []? Att   []? Unatt    []? TENS instruct                  []?IFC  []? Premod   []? NMES                     []?Other:  []?w/US   []?w/ice   []?w/heat  Position:  Location:        []? Traction: []? Cervical       []? Lumbar                       []? Prone          []? Supine                       []?Intermittent   []? Continuous Lbs:  []? before manual  []? after manual  []?w/heat     []? Ultrasound: []? Continuous   []? Pulsed                       at: []?1MHz   []? 3MHz Location:  W/cm2:     []? Paraffin         Location:   []?w/heat    to go [x]? Ice     []? Heat  []? Ice massage Position:  Location:     []? Laser  []? Other: Position:  Location:        []? Vasopneumatic Device Pressure:       []? lo []? med []? hi   Temperature:       [x]? Skin assessment post-treatment:  [x]? intact []? redness- no adverse reaction    []? redness  adverse reaction:      37 min Therapeutic Exercise:  [x]? ? See flow sheet :   Rationale: increase ROM and increase strength to improve the patients ability to perform ADLs and reduce pain levels     15  min Therapeutic Activity:  []  See flow sheet : prowler sled push   Rationale: increase ROM, increase strength, improve coordination, improve balance and increase proprioception  to improve the patients ability to push and run     10 min Manual Therapy:  STM achilles distal insertion and mid portion of lateral gastroc,    Rationale: decrease pain, increase ROM and increase tissue extensibility  to improve the patients ability to perform ADLs and reduce pain levels     With   []?? TE   []?? TA   []?? neuro   []?? other: Patient Education: [x]? ? Review HEP    []? ? Progressed/Changed HEP based on:   []?? positioning   []? ? body mechanics   []? ? transfers   []? ? heat/ice application    []? ? other:       Other Objective/Functional Measures: --     Pain Level (0-10 scale) post treatment: 0/10     ASSESSMENT/Changes in Function:   Able to push sled with sufficient ankle plantarflexion force and no delayed movement once cued for performance. Patient will continue to benefit from skilled PT services to modify and progress therapeutic interventions, address functional mobility deficits, address ROM deficits, address strength deficits, analyze and address soft tissue restrictions, analyze and cue movement patterns, analyze and modify body mechanics/ergonomics and assess and modify postural abnormalities to attain remaining goals.     []? ?  See Plan of Care  []? ?  See progress note/recertification  []? ?  See Discharge Summary     Progress towards goals / Updated goals:  Short Term Goals: To be accomplished in 3-4 weeks:  1)  Pt will be independent with HEP. MET  2) Pt will demonstrate >/= 6 degrees to be able to ambulate on level surface with more normalized gait without increase of pain MET  3) Pt will be able to navigate stairs with step over step pattern without pain.  MET      Long Term Goals: To be accomplished in 20-24 weeks:  1)  Pt will be able to squat to the ground without deviation and even distribution of weight. MET  2) Pt will be able to perform jumping motion without valgus collapse of knee durign concentric and eccentric phases   3) Pt will be able to run >/= 1 mile without pain. 4) Pt will be able to perform lateral cutting movements without pain or instability. 5) Pt will be able to demonstrate rotational control with jumping movements >/= to contralateral limb.   . .      PLAN  [x]? ?  Upgrade activities as tolerated     [x]? ?  Continue plan of care  [x]? ?  Update interventions per flow sheet       []? ?  Discharge due to:_  []??  Other:_         Taqueria Kemp, PT, DPT 12/31/2019

## 2020-01-03 ENCOUNTER — HOSPITAL ENCOUNTER (OUTPATIENT)
Dept: PHYSICAL THERAPY | Age: 35
Discharge: HOME | End: 2020-01-03
Payer: COMMERCIAL

## 2020-01-03 PROCEDURE — 97530 THERAPEUTIC ACTIVITIES: CPT | Performed by: PHYSICAL THERAPIST

## 2020-01-03 PROCEDURE — 97110 THERAPEUTIC EXERCISES: CPT | Performed by: PHYSICAL THERAPIST

## 2020-01-03 PROCEDURE — 97140 MANUAL THERAPY 1/> REGIONS: CPT | Performed by: PHYSICAL THERAPIST

## 2020-01-03 NOTE — PROGRESS NOTES
PT DAILY TREATMENT NOTE - [facility] 2-15    Patient Name: Pat Weebr  Date:1/3/2020  : 1985  [x]  Patient  Verified  Payor: BLUE CROSS / Plan: Juan Miguel Zheng [id number] PPO / Product Type: PPO /    In time:800a  Out time:915a  Total Treatment Time (min): 75  Total Timed Codes (min): 70  1:1 Treatment Time ( only): 54   Visit #:  42    Treatment Area: Left ankle pain [M25.572]    SUBJECTIVE  Pain Level (0-10 scale): 0  Any medication changes, allergies to medications, adverse drug reactions, diagnosis change, or new procedure performed?: [x] No    [] Yes (see summary sheet for update)  Subjective functional status/changes:   [] No changes reported  Ankle is doing well. No issues with the sled push last visit. Just feels like his L leg is \"cheating\" when he does them. OBJECTIVE               Modality rationale: decrease edema, decrease inflammation and decrease pain to improve the patients ability to run without pain     Min Type Additional Details        []? ? Estim: []??Att   []? ? Unatt    []? ?TENS instruct                  []? ?IFC  []? ?Premod   []? ?NMES                     []? ? Other:  []??w/US   []? ?w/ice   []? ?w/heat  Position:  Location:        []? ?  Traction: []?? Cervical       []? ?Lumbar                       []? ? Prone          []? ?Supine                       []? ?Intermittent   []? ? Continuous Lbs:  []?? before manual  []? ? after manual  []? ?w/heat     []? ?  Ultrasound: []??Continuous   []? ? Pulsed                       at: []??1MHz   []? ? 3MHz Location:  W/cm2:     []? ? Paraffin     Location:   []??w/heat    to go [x]? ?  Ice     []? ?  Heat  []? ?  Ice massage Position:  Location:     []? ?  Laser  []? ?  Other: Position:  Location:        []? ?  Vasopneumatic Device Pressure:       []? ? lo []? ? med []?? hi   Temperature:       [x]? ? Skin assessment post-treatment:  [x]? ? intact []? ?redness- no adverse reaction    []? ?redness  adverse reaction:      50 min Therapeutic Exercise:  [x]? ?? See flow sheet : Rationale: increase ROM and increase strength to improve the patients ability to perform ADLs and reduce pain levels      15  min Therapeutic Activity:  []? See flow sheet : prowler sled push   Rationale: increase ROM, increase strength, improve coordination, improve balance and increase proprioception  to improve the patients ability to push and run     10 min Manual Therapy:  STM achilles distal insertion and mid portion of lateral gastroc    Rationale: decrease pain, increase ROM and increase tissue extensibility  to improve the patients ability to perform ADLs and reduce pain levels     With   []??? TE   []??? TA   []??? neuro   []? ?? other: Patient Education: [x]??? Review HEP    []? ?? Progressed/Changed HEP based on:   []??? positioning   []? ?? body mechanics   []? ?? transfers   []? ?? heat/ice application    []? ?? other:       Other Objective/Functional Measures: --     Pain Level (0-10 scale) post treatment: 0/10     ASSESSMENT/Changes in Function:   Reduced DF motion eccentrically when performing sled push exercise on L LE, but otherwise no pain and performing well at this time. Patient will continue to benefit from skilled PT services to modify and progress therapeutic interventions, address functional mobility deficits, address ROM deficits, address strength deficits, analyze and address soft tissue restrictions, analyze and cue movement patterns, analyze and modify body mechanics/ergonomics and assess and modify postural abnormalities to attain remaining goals.     []? ??  See Plan of Care  []? ??  See progress note/recertification  []? ??  See Discharge Summary     Progress towards goals / Updated goals:  Short Term Goals: To be accomplished in 3-4 weeks:  1)  Pt will be independent with HEP.  MET  2) Pt will demonstrate >/= 6 degrees to be able to ambulate on level surface with more normalized gait without increase of pain MET  3) Pt will be able to navigate stairs with step over step pattern without pain. MET      Long Term Goals: To be accomplished in 20-24 weeks:  1)  Pt will be able to squat to the ground without deviation and even distribution of weight. MET  2) Pt will be able to perform jumping motion without valgus collapse of knee durign concentric and eccentric phases   3) Pt will be able to run >/= 1 mile without pain. 4) Pt will be able to perform lateral cutting movements without pain or instability. 5) Pt will be able to demonstrate rotational control with jumping movements >/= to contralateral limb.   . .      PLAN  [x]???  Upgrade activities as tolerated     [x]? ??  Continue plan of care  [x]? ??  Update interventions per flow sheet       []???  Discharge due to:_  []???  Other:_      Sonia Leger, PT 1/3/2020

## 2020-01-07 ENCOUNTER — HOSPITAL ENCOUNTER (OUTPATIENT)
Dept: PHYSICAL THERAPY | Age: 35
Discharge: HOME | End: 2020-01-07
Payer: COMMERCIAL

## 2020-01-07 PROCEDURE — 97110 THERAPEUTIC EXERCISES: CPT | Performed by: PHYSICAL THERAPIST

## 2020-01-07 PROCEDURE — 97530 THERAPEUTIC ACTIVITIES: CPT | Performed by: PHYSICAL THERAPIST

## 2020-01-07 PROCEDURE — 97140 MANUAL THERAPY 1/> REGIONS: CPT | Performed by: PHYSICAL THERAPIST

## 2020-01-07 NOTE — PROGRESS NOTES
PT DAILY TREATMENT NOTE - [facility] 2-15    Patient Name: Claudette Siren  Date:2020  : 1985  [x]  Patient  Verified  Payor: BLUE CROSS / Plan: Juan Miguel Zheng [id number] PPO / Product Type: PPO /    In time:805a Out time:910a  Total Treatment Time (min): 65  Total Timed Codes (min): 65  1:1 Treatment Time ( only): 40   Visit #:  43    Treatment Area: Left ankle pain [M25.572]    SUBJECTIVE  Pain Level (0-10 scale): 0  Any medication changes, allergies to medications, adverse drug reactions, diagnosis change, or new procedure performed?: [x] No    [] Yes (see summary sheet for update)  Subjective functional status/changes:   [] No changes reported  Pt reports that he ran 2 miles without increase of achilles pain just quad soreness. OBJECTIVE               Modality rationale: decrease edema, decrease inflammation and decrease pain to improve the patients ability to run without pain     Min Type Additional Details        []? ? Estim: []??Att   []? ? Unatt    []? ?TENS instruct                  []? ?IFC  []? ?Premod   []? ?NMES                     []? ? Other:  []??w/US   []? ?w/ice   []? ?w/heat  Position:  Location:        []? ?  Traction: []?? Cervical       []? ?Lumbar                       []? ? Prone          []? ?Supine                       []? ?Intermittent   []? ? Continuous Lbs:  []?? before manual  []? ? after manual  []? ?w/heat     []? ?  Ultrasound: []??Continuous   []? ? Pulsed                       at: []??1MHz   []? ? 3MHz Location:  W/cm2:     []? ? Paraffin     Location:   []??w/heat    to go [x]? ?  Ice     []? ?  Heat  []? ?  Ice massage Position:  Location:     []? ?  Laser  []? ?  Other: Position:  Location:        []? ?  Vasopneumatic Device Pressure:       []? ? lo []? ? med []?? hi   Temperature:       [x]? ? Skin assessment post-treatment:  [x]? ? intact []? ?redness- no adverse reaction    []? ?redness  adverse reaction:      40 min Therapeutic Exercise:  [x]? ?? See flow sheet :   Rationale: increase ROM and increase strength to improve the patients ability to perform ADLs and reduce pain levels      15  min Therapeutic Activity:  []? See flow sheet : prowler sled push   Rationale: increase ROM, increase strength, improve coordination, improve balance and increase proprioception  to improve the patients ability to push and run     10 min Manual Therapy:  STM achilles distal insertion and mid portion of lateral gastroc    Rationale: decrease pain, increase ROM and increase tissue extensibility  to improve the patients ability to perform ADLs and reduce pain levels     With   []??? TE   []??? TA   []??? neuro   []? ?? other: Patient Education: [x]??? Review HEP    []? ?? Progressed/Changed HEP based on:   []??? positioning   []? ?? body mechanics   []? ?? transfers   []? ?? heat/ice application    []? ?? other:       Other Objective/Functional Measures: --     Pain Level (0-10 scale) post treatment: 0/10     ASSESSMENT/Changes in Function:   Better push off performance with prowler push. Reports \"tenderness\" at posterior heel at end of session today. Patient will continue to benefit from skilled PT services to modify and progress therapeutic interventions, address functional mobility deficits, address ROM deficits, address strength deficits, analyze and address soft tissue restrictions, analyze and cue movement patterns, analyze and modify body mechanics/ergonomics and assess and modify postural abnormalities to attain remaining goals.     []? ??  See Plan of Care  []? ??  See progress note/recertification  []? ??  See Discharge Summary     Progress towards goals / Updated goals:  Short Term Goals: To be accomplished in 3-4 weeks:  1)  Pt will be independent with HEP. MET  2) Pt will demonstrate >/= 6 degrees to be able to ambulate on level surface with more normalized gait without increase of pain MET  3) Pt will be able to navigate stairs with step over step pattern without pain.  MET      Long Term Goals: To be accomplished in 20-24 weeks:  1)  Pt will be able to squat to the ground without deviation and even distribution of weight. MET  2) Pt will be able to perform jumping motion without valgus collapse of knee durign concentric and eccentric phases   3) Pt will be able to run >/= 1 mile without pain. 4) Pt will be able to perform lateral cutting movements without pain or instability. 5) Pt will be able to demonstrate rotational control with jumping movements >/= to contralateral limb.   . .      PLAN  [x]???  Upgrade activities as tolerated     [x]? ??  Continue plan of care  [x]? ??  Update interventions per flow sheet       []???  Discharge due to:_  []???  Other:_      Colton Catalan, PT, DPT 1/7/2020

## 2020-01-10 ENCOUNTER — HOSPITAL ENCOUNTER (OUTPATIENT)
Dept: PHYSICAL THERAPY | Age: 35
Discharge: HOME | End: 2020-01-10
Payer: COMMERCIAL

## 2020-01-10 PROCEDURE — 97530 THERAPEUTIC ACTIVITIES: CPT | Performed by: PHYSICAL THERAPIST

## 2020-01-10 PROCEDURE — 97140 MANUAL THERAPY 1/> REGIONS: CPT | Performed by: PHYSICAL THERAPIST

## 2020-01-14 ENCOUNTER — HOSPITAL ENCOUNTER (OUTPATIENT)
Dept: PHYSICAL THERAPY | Age: 35
Discharge: HOME | End: 2020-01-14
Payer: COMMERCIAL

## 2020-01-14 PROCEDURE — 97140 MANUAL THERAPY 1/> REGIONS: CPT | Performed by: PHYSICAL THERAPIST

## 2020-01-14 PROCEDURE — 97110 THERAPEUTIC EXERCISES: CPT | Performed by: PHYSICAL THERAPIST

## 2020-01-14 NOTE — PROGRESS NOTES
PT DAILY TREATMENT NOTE - [facility] -15    Patient Name: Jessica Ortega  Date:2020  : 1985  [x]  Patient  Verified  Payor: Darryle Blander / Plan: Juan Miguel Zheng [id number] PPO / Product Type: PPO /    In time:805a Out time:910a  Total Treatment Time (min): 65  Total Timed Codes (min): 65  1:1 Treatment Time ( only): 30  Visit #:  45    Treatment Area: Left ankle pain [M25.572]    SUBJECTIVE  Pain Level (0-10 scale): 0/10  Any medication changes, allergies to medications, adverse drug reactions, diagnosis change, or new procedure performed?: [x] No    [] Yes (see summary sheet for update)  Subjective functional status/changes:   [] No changes reported  Pt reports that he does still have tightness in his heel that does reduce when he \"warms up. \". OBJECTIVE               Modality rationale: decrease edema, decrease inflammation and decrease pain to improve the patients ability to run without pain     Min Type Additional Details        []? ? Estim: []??Att   []? ? Unatt    []? ?TENS instruct                  []? ?IFC  []? ?Premod   []? ?NMES                     []? ? Other:  []??w/US   []? ?w/ice   []? ?w/heat  Position:  Location:        []? ?  Traction: []?? Cervical       []? ?Lumbar                       []? ? Prone          []? ?Supine                       []? ?Intermittent   []? ? Continuous Lbs:  []?? before manual  []? ? after manual  []? ?w/heat     []? ?  Ultrasound: []??Continuous   []? ? Pulsed                       at: []??1MHz   []? ? 3MHz Location:  W/cm2:     []? ? Paraffin     Location:   []??w/heat    to go [x]? ?  Ice     []? ?  Heat  []? ?  Ice massage Position:  Location:     []? ?  Laser  []? ?  Other: Position:  Location:        []? ?  Vasopneumatic Device Pressure:       []? ? lo []? ? med []?? hi   Temperature:       [x]? ? Skin assessment post-treatment:  [x]? ? intact []? ?redness- no adverse reaction    []? ?redness  adverse reaction:      55 min Therapeutic Exercise:  [x]? ?? See flow sheet :   Rationale: increase ROM and increase strength to improve the patients ability to perform ADLs and reduce pain levels         10 min Manual Therapy:  STM achilles distal insertion and mid portion of lateral gastroc    Rationale: decrease pain, increase ROM and increase tissue extensibility  to improve the patients ability to perform ADLs and reduce pain levels     With   []??? TE   []??? TA   []??? neuro   []? ?? other: Patient Education: [x]??? Review HEP    []? ?? Progressed/Changed HEP based on:   []??? positioning   []? ?? body mechanics   []? ?? transfers   []? ?? heat/ice application    []? ?? other:       Other Objective/Functional Measures: --     Pain Level (0-10 scale) post treatment: 0/10     ASSESSMENT/Changes in Function:   Required cueing to reduce un weighted bias on left foot. Able to get to 50-50 weight balance distribution by end. Patient will continue to benefit from skilled PT services to modify and progress therapeutic interventions, address functional mobility deficits, address ROM deficits, address strength deficits, analyze and address soft tissue restrictions, analyze and cue movement patterns, analyze and modify body mechanics/ergonomics and assess and modify postural abnormalities to attain remaining goals.     []? ??  See Plan of Care  []? ??  See progress note/recertification  []? ??  See Discharge Summary     Progress towards goals / Updated goals:  Short Term Goals: To be accomplished in 3-4 weeks:  1)  Pt will be independent with HEP. MET  2) Pt will demonstrate >/= 6 degrees to be able to ambulate on level surface with more normalized gait without increase of pain MET  3) Pt will be able to navigate stairs with step over step pattern without pain. MET      Long Term Goals: To be accomplished in 20-24 weeks:  1)  Pt will be able to squat to the ground without deviation and even distribution of weight.  MET  2) Pt will be able to perform jumping motion without valgus collapse of knee durign concentric and eccentric phases   3) Pt will be able to run >/= 1 mile without pain. 4) Pt will be able to perform lateral cutting movements without pain or instability. 5) Pt will be able to demonstrate rotational control with jumping movements >/= to contralateral limb.   . .      PLAN  [x]???  Upgrade activities as tolerated     [x]? ??  Continue plan of care  [x]? ??  Update interventions per flow sheet       []???  Discharge due to:_  []???  Other:_      Devon Castaneda, PT, DPT 1/14/2020

## 2020-01-17 ENCOUNTER — HOSPITAL ENCOUNTER (OUTPATIENT)
Dept: PHYSICAL THERAPY | Age: 35
Discharge: HOME | End: 2020-01-17
Payer: COMMERCIAL

## 2020-01-17 PROCEDURE — 97110 THERAPEUTIC EXERCISES: CPT | Performed by: PHYSICAL THERAPIST

## 2020-01-17 PROCEDURE — 97530 THERAPEUTIC ACTIVITIES: CPT | Performed by: PHYSICAL THERAPIST

## 2020-01-17 NOTE — PROGRESS NOTES
PT DAILY TREATMENT NOTE - [facility] -15    Patient Name: Brandy Callaway  Date:2020  : 1985  [x]  Patient  Verified  Payor: Hakan Edmonds / Plan: Juan Miguel Zheng 5747 PPO / Product Type: PPO /    In time: 200a Out time: 935a  Total Treatment Time (min): 90  Total Timed Codes (min): 90  1:1 Treatment Time ( only): 60  Visit #:  55    Treatment Area: Left ankle pain [M25.572]    SUBJECTIVE  Pain Level (0-10 scale): 0/10  Any medication changes, allergies to medications, adverse drug reactions, diagnosis change, or new procedure performed?: [x] No    [] Yes (see summary sheet for update)  Subjective functional status/changes:   [] No changes reported  Pt reports that he is doing better with running but still feels like he has a postiion that is week when he pushed off with his heel close to the ground. OBJECTIVE               Modality rationale: decrease edema, decrease inflammation and decrease pain to improve the patients ability to run without pain     Min Type Additional Details        []? ? Estim: []??Att   []? ? Unatt    []? ?TENS instruct                  []? ?IFC  []? ?Premod   []? ?NMES                     []? ? Other:  []??w/US   []? ?w/ice   []? ?w/heat  Position:  Location:        []? ?  Traction: []?? Cervical       []? ?Lumbar                       []? ? Prone          []? ?Supine                       []? ?Intermittent   []? ? Continuous Lbs:  []?? before manual  []? ? after manual  []? ?w/heat     []? ?  Ultrasound: []??Continuous   []? ? Pulsed                       at: []??1MHz   []? ? 3MHz Location:  W/cm2:     []? ? Paraffin     Location:   []??w/heat    to go [x]? ?  Ice     []? ?  Heat  []? ?  Ice massage Position:  Location:     []? ?  Laser  []? ?  Other: Position:  Location:        []? ?  Vasopneumatic Device Pressure:       []? ? lo []? ? med []?? hi   Temperature:       [x]? ? Skin assessment post-treatment:  [x]? ? intact []? ?redness- no adverse reaction    []? ?redness  adverse reaction:      50 min Therapeutic Exercise:  [x]? ?? See flow sheet :   Rationale: increase ROM and increase strength to improve the patients ability to perform ADLs and reduce pain levels      30  min Therapeutic Activity:  []? See flow sheet : prowler sled push; plyometric jumping of 12\" box, line sprints with directional changing, transition turning   Rationale: increase ROM, increase strength, improve coordination, improve balance and increase proprioception  to improve the patients ability to push and run     10 min Manual Therapy:  STM achilles distal insertion and mid portion of lateral gastroc    Rationale: decrease pain, increase ROM and increase tissue extensibility  to improve the patients ability to perform ADLs and reduce pain levels     With   []??? TE   []??? TA   []??? neuro   []? ?? other: Patient Education: [x]??? Review HEP    []? ?? Progressed/Changed HEP based on:   []??? positioning   []? ?? body mechanics   []? ?? transfers   []? ?? heat/ice application    []? ?? other:       Other Objective/Functional Measures:   Single Hop Test : R 206 cm         L 143 cm    70%    Triple crossover hop Test: R 490 cm             L 375 cm     77%     Pain Level (0-10 scale) post treatment: 0/10     ASSESSMENT/Changes in Function:   Required cueign and training for foot placement coordination to perform transition from back pedal to forward propulsion. Very apprehensive with single limb hop test with failure to land on first 2 attempts collapsing to the ground. Once he shortened his attempt he was able to successfully make and land the single limb hop forward. Single limb hop is 70% of his un-involved side and triple crossover hop is 77% of his un-involved side.   Patient will continue to benefit from skilled PT services to modify and progress therapeutic interventions, address functional mobility deficits, address ROM deficits, address strength deficits, analyze and address soft tissue restrictions, analyze and cue movement patterns, analyze and modify body mechanics/ergonomics and assess and modify postural abnormalities to attain remaining goals.     []? ??  See Plan of Care  []? ??  See progress note/recertification  []? ??  See Discharge Summary     Progress towards goals / Updated goals:  Short Term Goals: To be accomplished in 3-4 weeks:  1)  Pt will be independent with HEP. MET  2) Pt will demonstrate >/= 6 degrees to be able to ambulate on level surface with more normalized gait without increase of pain MET  3) Pt will be able to navigate stairs with step over step pattern without pain. MET      Long Term Goals: To be accomplished in 20-24 weeks:  1)  Pt will be able to squat to the ground without deviation and even distribution of weight. MET  2) Pt will be able to perform jumping motion without valgus collapse of knee durign concentric and eccentric phases   3) Pt will be able to run >/= 1 mile without pain. MET  4) Pt will be able to perform lateral cutting movements without pain or instability. MET  5) Pt will be able to demonstrate rotational control with jumping movements >/= to contralateral limb.   . .      PLAN  [x]???  Upgrade activities as tolerated     [x]? ??  Continue plan of care  [x]? ??  Update interventions per flow sheet       []???  Discharge due to:_  []???  Other:_      Denisse Page, PT, DPT 1/17/2020

## 2020-01-21 ENCOUNTER — HOSPITAL ENCOUNTER (OUTPATIENT)
Dept: PHYSICAL THERAPY | Age: 35
Discharge: HOME | End: 2020-01-21
Payer: COMMERCIAL

## 2020-01-21 PROCEDURE — 97140 MANUAL THERAPY 1/> REGIONS: CPT | Performed by: PHYSICAL THERAPIST

## 2020-01-21 PROCEDURE — 97530 THERAPEUTIC ACTIVITIES: CPT | Performed by: PHYSICAL THERAPIST

## 2020-01-21 NOTE — PROGRESS NOTES
PT DAILY TREATMENT NOTE - [facility] 2-15    Patient Name: Nickie Prather  Date:2020  : 1985  [x]  Patient  Verified  Payor: BLUE CROSS / Plan: Juan Miguel Zheng [id number] PPO / Product Type: PPO /    In time: 810a Out time: 910a  Total Treatment Time (min): 60  Total Timed Codes (min): 60  1:1 Treatment Time ( only): 30  Visit #:  52    Treatment Area: Left ankle pain [M25.572]    SUBJECTIVE  Pain Level (0-10 scale): 0/10  Any medication changes, allergies to medications, adverse drug reactions, diagnosis change, or new procedure performed?: [x] No    [] Yes (see summary sheet for update)  Subjective functional status/changes:   [] No changes reported  Pt reports that he did not have any pain after last session, just a \"thought I would have done better on the jump testing. \"  Did also report that he does still notice fatigue and limited muscular endurance with working out his arms at the gym. OBJECTIVE               Modality rationale: decrease edema, decrease inflammation and decrease pain to improve the patients ability to run without pain     Min Type Additional Details        []? ? Estim: []??Att   []? ? Unatt    []? ?TENS instruct                  []? ?IFC  []? ?Premod   []? ?NMES                     []? ? Other:  []??w/US   []? ?w/ice   []? ?w/heat  Position:  Location:        []? ?  Traction: []?? Cervical       []? ?Lumbar                       []? ? Prone          []? ?Supine                       []? ?Intermittent   []? ? Continuous Lbs:  []?? before manual  []? ? after manual  []? ?w/heat     []? ?  Ultrasound: []??Continuous   []? ? Pulsed                       at: []??1MHz   []? ? 3MHz Location:  W/cm2:     []? ? Paraffin     Location:   []??w/heat    to go [x]? ?  Ice     []? ?  Heat  []? ?  Ice massage Position:  Location:     []? ?  Laser  []? ?  Other: Position:  Location:        []? ?  Vasopneumatic Device Pressure:       []? ? lo []? ? med []?? hi   Temperature:       [x]? ? Skin assessment post-treatment:  [x]? ? intact []??redness- no adverse reaction    []? ?redness  adverse reaction:      35 min Therapeutic Exercise:  [x]? ?? See flow sheet :   Rationale: increase ROM and increase strength to improve the patients ability to perform ADLs and reduce pain levels      15 min Therapeutic Activity:  []? See flow sheet : prowler sled push; , line sprints with directional changing, transition turning   Rationale: increase ROM, increase strength, improve coordination, improve balance and increase proprioception  to improve the patients ability to push and run     10 min Manual Therapy:  STM achilles distal insertion and mid portion of lateral gastroc    Rationale: decrease pain, increase ROM and increase tissue extensibility  to improve the patients ability to perform ADLs and reduce pain levels     With   []??? TE   []??? TA   []??? neuro   []? ?? other: Patient Education: [x]??? Review HEP    []? ?? Progressed/Changed HEP based on:   []??? positioning   []? ?? body mechanics   []? ?? transfers   []? ?? heat/ice application    []? ?? other:       Other Objective/Functional Measures:        Pain Level (0-10 scale) post treatment: 0/10     ASSESSMENT/Changes in Function:   Continues to Required cueing and training for foot placement coordination to perform transition from back pedal to forward propulsion. Did demonstrated improvement in movement pattern after breaking allyson down into individual componenet of single limb foot placements. Patient will continue to benefit from skilled PT services to modify and progress therapeutic interventions, address functional mobility deficits, address ROM deficits, address strength deficits, analyze and address soft tissue restrictions, analyze and cue movement patterns, analyze and modify body mechanics/ergonomics and assess and modify postural abnormalities to attain remaining goals.     []? ??  See Plan of Care  []? ??  See progress note/recertification  []? ??  See Discharge Summary     Progress towards goals / Updated goals:  Short Term Goals: To be accomplished in 3-4 weeks:  1)  Pt will be independent with HEP. MET  2) Pt will demonstrate >/= 6 degrees to be able to ambulate on level surface with more normalized gait without increase of pain MET  3) Pt will be able to navigate stairs with step over step pattern without pain. MET      Long Term Goals: To be accomplished in 20-24 weeks:  1)  Pt will be able to squat to the ground without deviation and even distribution of weight. MET  2) Pt will be able to perform jumping motion without valgus collapse of knee durign concentric and eccentric phases   3) Pt will be able to run >/= 1 mile without pain. MET  4) Pt will be able to perform lateral cutting movements without pain or instability. MET  5) Pt will be able to demonstrate rotational control with jumping movements >/= to contralateral limb.   . .      PLAN  [x]???  Upgrade activities as tolerated     [x]? ??  Continue plan of care  [x]? ??  Update interventions per flow sheet       []???  Discharge due to:_  []???  Other:_      Cherie Johnson, PT, DPT 1/21/2020

## 2020-01-24 ENCOUNTER — HOSPITAL ENCOUNTER (OUTPATIENT)
Dept: PHYSICAL THERAPY | Age: 35
Discharge: HOME | End: 2020-01-24
Payer: COMMERCIAL

## 2020-01-24 PROCEDURE — 97530 THERAPEUTIC ACTIVITIES: CPT | Performed by: PHYSICAL THERAPIST

## 2020-01-24 PROCEDURE — 97140 MANUAL THERAPY 1/> REGIONS: CPT | Performed by: PHYSICAL THERAPIST

## 2020-01-29 ENCOUNTER — HOSPITAL ENCOUNTER (OUTPATIENT)
Dept: PHYSICAL THERAPY | Age: 35
Discharge: HOME | End: 2020-01-29
Payer: COMMERCIAL

## 2020-01-29 PROCEDURE — 97140 MANUAL THERAPY 1/> REGIONS: CPT | Performed by: PHYSICAL THERAPIST

## 2020-01-29 PROCEDURE — 97110 THERAPEUTIC EXERCISES: CPT | Performed by: PHYSICAL THERAPIST

## 2020-01-29 NOTE — PROGRESS NOTES
PT DAILY TREATMENT NOTE - [facility] 2-15    Patient Name: Shira Abraham  Date:2020  : 1985  [x]  Patient  Verified  Payor: BLUE CROSS / Plan: Juan Miguel Zheng [id number] PPO / Product Type: PPO /    In time: 810a Out time: 910a  Total Treatment Time (min): 60  Total Timed Codes (min): 60  1:1 Treatment Time ( only): 30  Visit #:  52    Treatment Area: Left ankle pain [M25.572]    SUBJECTIVE  Pain Level (0-10 scale): 0/10  Any medication changes, allergies to medications, adverse drug reactions, diagnosis change, or new procedure performed?: [x] No    [] Yes (see summary sheet for update)  Subjective functional status/changes:   [] No changes reported  Pt reports that he attempted to do some box jumping and almost fell on his face as he was apprehensive with the movement on his own. He was able to improve technique and complete the exercise on smaller box (12 inches)    OBJECTIVE               Modality rationale: decrease edema, decrease inflammation and decrease pain to improve the patients ability to run without pain     Min Type Additional Details        []? ? Estim: []??Att   []? ? Unatt    []? ?TENS instruct                  []? ?IFC  []? ?Premod   []? ?NMES                     []? ? Other:  []??w/US   []? ?w/ice   []? ?w/heat  Position:  Location:        []? ?  Traction: []?? Cervical       []? ?Lumbar                       []? ? Prone          []? ?Supine                       []? ?Intermittent   []? ? Continuous Lbs:  []?? before manual  []? ? after manual  []? ?w/heat     []? ?  Ultrasound: []??Continuous   []? ? Pulsed                       at: []??1MHz   []? ? 3MHz Location:  W/cm2:     []? ? Paraffin     Location:   []??w/heat    to go [x]? ?  Ice     []? ?  Heat  []? ?  Ice massage Position:  Location:     []? ?  Laser  []? ?  Other: Position:  Location:        []? ?  Vasopneumatic Device Pressure:       []? ? lo []? ? med []?? hi   Temperature:       [x]? ? Skin assessment post-treatment:  [x]? ? intact []? ?redness- no adverse reaction    []? ?redness  adverse reaction:      35 min Therapeutic Exercise:  [x]? ?? See flow sheet :   Rationale: increase ROM and increase strength to improve the patients ability to perform ADLs and reduce pain levels      15  min Therapeutic Activity:  []? See flow sheet : prowler sled push; , line sprints with directional changing, transition turning   Rationale: increase ROM, increase strength, improve coordination, improve balance and increase proprioception  to improve the patients ability to push and run     10 min Manual Therapy:  STM achilles distal insertion and mid portion of lateral gastroc    Rationale: decrease pain, increase ROM and increase tissue extensibility  to improve the patients ability to perform ADLs and reduce pain levels     With   []??? TE   []??? TA   []??? neuro   []? ?? other: Patient Education: [x]??? Review HEP    []? ?? Progressed/Changed HEP based on:   []??? positioning   []? ?? body mechanics   []? ?? transfers   []? ?? heat/ice application    []? ?? other:       Other Objective/Functional Measures:      Pain Level (0-10 scale) post treatment: 0/10     ASSESSMENT/Changes in Function:   Worked progressive increased weight bearing with jumping up and down off 12 inch box moving to 24 inch box. Requires cueing to shift weight to left LE for landing and jumping onto higher box. Patient will continue to benefit from skilled PT services to modify and progress therapeutic interventions, address functional mobility deficits, address ROM deficits, address strength deficits, analyze and address soft tissue restrictions, analyze and cue movement patterns, analyze and modify body mechanics/ergonomics and assess and modify postural abnormalities to attain remaining goals.     []? ??  See Plan of Care  []? ??  See progress note/recertification  []? ??  See Discharge Summary     Progress towards goals / Updated goals:  Short Term Goals: To be accomplished in 3-4 weeks:  1)  Pt will be independent with HEP. MET  2) Pt will demonstrate >/= 6 degrees to be able to ambulate on level surface with more normalized gait without increase of pain MET  3) Pt will be able to navigate stairs with step over step pattern without pain. MET      Long Term Goals: To be accomplished in 20-24 weeks:  1)  Pt will be able to squat to the ground without deviation and even distribution of weight. MET  2) Pt will be able to perform jumping motion without valgus collapse of knee durign concentric and eccentric phases   3) Pt will be able to run >/= 1 mile without pain. MET  4) Pt will be able to perform lateral cutting movements without pain or instability. MET  5) Pt will be able to demonstrate rotational control with jumping movements >/= to contralateral limb.   . .      PLAN  [x]???  Upgrade activities as tolerated     [x]? ??  Continue plan of care  [x]? ??  Update interventions per flow sheet       []???  Discharge due to:_  []???  Other:_      Tomas Koo, PT, DPT 1/29/2020

## 2020-01-31 ENCOUNTER — HOSPITAL ENCOUNTER (OUTPATIENT)
Dept: PHYSICAL THERAPY | Age: 35
Discharge: HOME | End: 2020-01-31
Payer: COMMERCIAL

## 2020-01-31 PROCEDURE — 97110 THERAPEUTIC EXERCISES: CPT | Performed by: PHYSICAL THERAPIST

## 2020-01-31 NOTE — PROGRESS NOTES
PT DAILY TREATMENT NOTE - [facility] 2-15    Patient Name: Willian Bush  Date:2020  : 1985  [x]  Patient  Verified  Payor: Aman Montiel / Plan: Juan Miguel Zheng [id number] PPO / Product Type: PPO /    In time: 800a Out time: 855a  Total Treatment Time (min): 55  Total Timed Codes (min): 55  1:1 Treatment Time ( only): 25  Visit #:  50    Treatment Area: Left ankle pain [M25.572]    SUBJECTIVE  Pain Level (0-10 scale): 0/10  Any medication changes, allergies to medications, adverse drug reactions, diagnosis change, or new procedure performed?: [x] No    [] Yes (see summary sheet for update)  Subjective functional status/changes:   [] No changes reported  Pt reports that he did not have any increased soreness after last session. OBJECTIVE               Modality rationale: decrease edema, decrease inflammation and decrease pain to improve the patients ability to run without pain     Min Type Additional Details        []? ? Estim: []??Att   []? ? Unatt    []? ?TENS instruct                  []? ?IFC  []? ?Premod   []? ?NMES                     []? ? Other:  []??w/US   []? ?w/ice   []? ?w/heat  Position:  Location:        []? ?  Traction: []?? Cervical       []? ?Lumbar                       []? ? Prone          []? ?Supine                       []? ?Intermittent   []? ? Continuous Lbs:  []?? before manual  []? ? after manual  []? ?w/heat     []? ?  Ultrasound: []??Continuous   []? ? Pulsed                       at: []??1MHz   []? ? 3MHz Location:  W/cm2:     []? ? Paraffin     Location:   []??w/heat    to go [x]? ?  Ice     []? ?  Heat  []? ?  Ice massage Position:  Location:     []? ?  Laser  []? ?  Other: Position:  Location:        []? ?  Vasopneumatic Device Pressure:       []? ? lo []? ? med []?? hi   Temperature:       [x]? ? Skin assessment post-treatment:  [x]? ? intact []? ?redness- no adverse reaction    []? ?redness  adverse reaction:      40 min Therapeutic Exercise:  [x]? ?? See flow sheet :   Rationale: increase ROM and increase strength to improve the patients ability to perform ADLs and reduce pain levels      15  min Therapeutic Activity:  []? See flow sheet : prowler sled push; , line sprints with directional changing, transition turning   Rationale: increase ROM, increase strength, improve coordination, improve balance and increase proprioception  to improve the patients ability to push and run        With   []??? TE   []??? TA   []??? neuro   []? ?? other: Patient Education: [x]??? Review HEP    []? ?? Progressed/Changed HEP based on:   []??? positioning   []? ?? body mechanics   []? ?? transfers   []? ?? heat/ice application    []? ?? other:       Other Objective/Functional Measures:      Pain Level (0-10 scale) post treatment: 0/10     ASSESSMENT/Changes in Function:   Did not do any STM to gastroc and achilles today to see how he does without it. He is doing much better with foot work but does still tend to shift more weight to uninvolved limb with jumping tasks. Patient will continue to benefit from skilled PT services to modify and progress therapeutic interventions, address functional mobility deficits, address ROM deficits, address strength deficits, analyze and address soft tissue restrictions, analyze and cue movement patterns, analyze and modify body mechanics/ergonomics and assess and modify postural abnormalities to attain remaining goals.     []? ??  See Plan of Care  []? ??  See progress note/recertification  []? ??  See Discharge Summary     Progress towards goals / Updated goals:  Short Term Goals: To be accomplished in 3-4 weeks:  1)  Pt will be independent with HEP. MET  2) Pt will demonstrate >/= 6 degrees to be able to ambulate on level surface with more normalized gait without increase of pain MET  3) Pt will be able to navigate stairs with step over step pattern without pain.  MET      Long Term Goals: To be accomplished in 20-24 weeks:  1)  Pt will be able to squat to the ground without deviation and even distribution of weight. MET  2) Pt will be able to perform jumping motion without valgus collapse of knee durign concentric and eccentric phases   3) Pt will be able to run >/= 1 mile without pain. MET  4) Pt will be able to perform lateral cutting movements without pain or instability. MET  5) Pt will be able to demonstrate rotational control with jumping movements >/= to contralateral limb.   . .      PLAN  [x]???  Upgrade activities as tolerated     [x]? ??  Continue plan of care  [x]? ??  Update interventions per flow sheet       []???  Discharge due to:_  []???  Other:_      Isaías Campos, PT, DPT 1/31/2020

## 2020-02-05 ENCOUNTER — HOSPITAL ENCOUNTER (OUTPATIENT)
Dept: PHYSICAL THERAPY | Age: 35
Discharge: HOME | End: 2020-02-05
Payer: COMMERCIAL

## 2020-02-05 PROCEDURE — 97530 THERAPEUTIC ACTIVITIES: CPT | Performed by: PHYSICAL THERAPIST

## 2020-02-05 PROCEDURE — 97110 THERAPEUTIC EXERCISES: CPT | Performed by: PHYSICAL THERAPIST

## 2020-02-05 NOTE — PROGRESS NOTES
PT DAILY TREATMENT NOTE - [facility] 2-15    Patient Name: Deshaun Jin  Date:2020  : 1985  [x]  Patient  Verified  Payor: Cristhian Arreola / Plan: Juan Miguel Zheng [id number] PPO / Product Type: PPO /    In time: 825a Out time: 930a  Total Treatment Time (min): 65  Total Timed Codes (min): 65  1:1 Treatment Time ( only): 30  Visit #:  46    Treatment Area: Left ankle pain [M25.572]    SUBJECTIVE  Pain Level (0-10 scale): 0/10  Any medication changes, allergies to medications, adverse drug reactions, diagnosis change, or new procedure performed?: [x] No    [] Yes (see summary sheet for update)  Subjective functional status/changes:   [] No changes reported  Pt reports that feels like his confidence in jumping is improving. OBJECTIVE               Modality rationale: decrease edema, decrease inflammation and decrease pain to improve the patients ability to run without pain     Min Type Additional Details        []? ? Estim: []??Att   []? ? Unatt    []? ?TENS instruct                  []? ?IFC  []? ?Premod   []? ?NMES                     []? ? Other:  []??w/US   []? ?w/ice   []? ?w/heat  Position:  Location:        []? ?  Traction: []?? Cervical       []? ?Lumbar                       []? ? Prone          []? ?Supine                       []? ?Intermittent   []? ? Continuous Lbs:  []?? before manual  []? ? after manual  []? ?w/heat     []? ?  Ultrasound: []??Continuous   []? ? Pulsed                       at: []??1MHz   []? ? 3MHz Location:  W/cm2:     []? ? Paraffin     Location:   []??w/heat    to go [x]? ?  Ice     []? ?  Heat  []? ?  Ice massage Position:  Location:     []? ?  Laser  []? ?  Other: Position:  Location:        []? ?  Vasopneumatic Device Pressure:       []? ? lo []? ? med []?? hi   Temperature:       [x]? ? Skin assessment post-treatment:  [x]? ? intact []? ?redness- no adverse reaction    []? ?redness  adverse reaction:      50 min Therapeutic Exercise:  [x]? ?? See flow sheet :   Rationale: increase ROM and increase strength to improve the patients ability to perform ADLs and reduce pain levels      15  min Therapeutic Activity:  []? See flow sheet : prowler sled push; , linear repeated jumping   Rationale: increase ROM, increase strength, improve coordination, improve balance and increase proprioception  to improve the patients ability to push and run        With   []??? TE   []??? TA   []??? neuro   []? ?? other: Patient Education: [x]??? Review HEP    []? ?? Progressed/Changed HEP based on:   []??? positioning   []? ?? body mechanics   []? ?? transfers   []? ?? heat/ice application    []? ?? other:       Other Objective/Functional Measures:   Single limb hop: R 211 cm          L 180 cm     Pain Level (0-10 scale) post treatment: 0/10     ASSESSMENT/Changes in Function:   Improving ability to perform consecutive single limb hopping but still difficulty with jumping off center line while moving forward on one leg repeated jumping. Patient will continue to benefit from skilled PT services to modify and progress therapeutic interventions, address functional mobility deficits, address ROM deficits, address strength deficits, analyze and address soft tissue restrictions, analyze and cue movement patterns, analyze and modify body mechanics/ergonomics and assess and modify postural abnormalities to attain remaining goals.     []? ??  See Plan of Care  []? ??  See progress note/recertification  []? ??  See Discharge Summary     Progress towards goals / Updated goals:  Short Term Goals: To be accomplished in 3-4 weeks:  1)  Pt will be independent with HEP. MET  2) Pt will demonstrate >/= 6 degrees to be able to ambulate on level surface with more normalized gait without increase of pain MET  3) Pt will be able to navigate stairs with step over step pattern without pain. MET      Long Term Goals: To be accomplished in 20-24 weeks:  1)  Pt will be able to squat to the ground without deviation and even distribution of weight.  MET  2) Pt will be able to perform jumping motion without valgus collapse of knee durign concentric and eccentric phases   3) Pt will be able to run >/= 1 mile without pain. MET  4) Pt will be able to perform lateral cutting movements without pain or instability. MET  5) Pt will be able to demonstrate rotational control with jumping movements >/= to contralateral limb.   . .      PLAN  [x]???  Upgrade activities as tolerated     [x]? ??  Continue plan of care  [x]? ??  Update interventions per flow sheet       []???  Discharge due to:_  []???  Other:_      Conchis Hart, PT, DPT 2/5/2020

## 2020-02-07 ENCOUNTER — HOSPITAL ENCOUNTER (OUTPATIENT)
Dept: PHYSICAL THERAPY | Age: 35
Discharge: HOME | End: 2020-02-07
Payer: COMMERCIAL

## 2020-02-07 PROCEDURE — 97530 THERAPEUTIC ACTIVITIES: CPT | Performed by: PHYSICAL THERAPIST

## 2020-02-07 NOTE — PROGRESS NOTES
PT DAILY TREATMENT NOTE - [facility] 2-15    Patient Name: Megan Mis  Date:2020  : 1985  [x]  Patient  Verified  Payor: BLUE CROSS / Plan: Juan Miguel Zheng 5747 PPO / Product Type: PPO /    In time: 12a Out time: 915a  Total Treatment Time (min): 65  Total Timed Codes (min): 65  1:1 Treatment Time ( only): 15  Visit #:  46    Treatment Area: Left ankle pain [M25.572]    SUBJECTIVE  Pain Level (0-10 scale): 0/10  Any medication changes, allergies to medications, adverse drug reactions, diagnosis change, or new procedure performed?: [x] No    [] Yes (see summary sheet for update)  Subjective functional status/changes:   [] No changes reported  Pt reports that feels like his confidence in jumping is improving. OBJECTIVE               Modality rationale: decrease edema, decrease inflammation and decrease pain to improve the patients ability to run without pain     Min Type Additional Details        []? ? Estim: []??Att   []? ? Unatt    []? ?TENS instruct                  []? ?IFC  []? ?Premod   []? ?NMES                     []? ? Other:  []??w/US   []? ?w/ice   []? ?w/heat  Position:  Location:        []? ?  Traction: []?? Cervical       []? ?Lumbar                       []? ? Prone          []? ?Supine                       []? ?Intermittent   []? ? Continuous Lbs:  []?? before manual  []? ? after manual  []? ?w/heat     []? ?  Ultrasound: []??Continuous   []? ? Pulsed                       at: []??1MHz   []? ? 3MHz Location:  W/cm2:     []? ? Paraffin     Location:   []??w/heat    to go [x]? ?  Ice     []? ?  Heat  []? ?  Ice massage Position:  Location:     []? ?  Laser  []? ?  Other: Position:  Location:        []? ?  Vasopneumatic Device Pressure:       []? ? lo []? ? med []?? hi   Temperature:       [x]? ? Skin assessment post-treatment:  [x]? ? intact []? ?redness- no adverse reaction    []? ?redness  adverse reaction:      50 min Therapeutic Exercise:  [x]? ?? See flow sheet :   Rationale: increase ROM and increase strength to improve the patients ability to perform ADLs and reduce pain levels      15  min Therapeutic Activity:  []? See flow sheet : prowler sled push; , linear repeated jumping   Rationale: increase ROM, increase strength, improve coordination, improve balance and increase proprioception  to improve the patients ability to push and run        With   []??? TE   []??? TA   []??? neuro   []? ?? other: Patient Education: [x]??? Review HEP    []? ?? Progressed/Changed HEP based on:   []??? positioning   []? ?? body mechanics   []? ?? transfers   []? ?? heat/ice application    []? ?? other:       Other Objective/Functional Measures:        Pain Level (0-10 scale) post treatment: 0/10     ASSESSMENT/Changes in Function:   Attempted lateral cutting movement today at 85% intensity and able to comlpete without increase of pain or visible difference in push off force oR vs L. Will continue to push higher intensity with this movement. Patient will continue to benefit from skilled PT services to modify and progress therapeutic interventions, address functional mobility deficits, address ROM deficits, address strength deficits, analyze and address soft tissue restrictions, analyze and cue movement patterns, analyze and modify body mechanics/ergonomics and assess and modify postural abnormalities to attain remaining goals.     []? ??  See Plan of Care  []? ??  See progress note/recertification  []? ??  See Discharge Summary     Progress towards goals / Updated goals:  Short Term Goals: To be accomplished in 3-4 weeks:  1)  Pt will be independent with HEP. MET  2) Pt will demonstrate >/= 6 degrees to be able to ambulate on level surface with more normalized gait without increase of pain MET  3) Pt will be able to navigate stairs with step over step pattern without pain. MET      Long Term Goals: To be accomplished in 20-24 weeks:  1)  Pt will be able to squat to the ground without deviation and even distribution of weight.  MET  2) Pt will be able to perform jumping motion without valgus collapse of knee durign concentric and eccentric phases   3) Pt will be able to run >/= 1 mile without pain. MET  4) Pt will be able to perform lateral cutting movements without pain or instability. MET  5) Pt will be able to demonstrate rotational control with jumping movements >/= to contralateral limb.   . .      PLAN  [x]???  Upgrade activities as tolerated     [x]? ??  Continue plan of care  [x]? ??  Update interventions per flow sheet       []???  Discharge due to:_  []???  Other:_      Nancye Closs, PT, DPT 2/7/2020

## 2020-02-08 ENCOUNTER — APPOINTMENT (OUTPATIENT)
Dept: PHYSICAL THERAPY | Age: 35
End: 2020-02-08
Payer: COMMERCIAL

## 2020-02-10 ENCOUNTER — HOSPITAL ENCOUNTER (OUTPATIENT)
Dept: PHYSICAL THERAPY | Age: 35
Discharge: HOME | End: 2020-02-10
Payer: COMMERCIAL

## 2020-02-10 PROCEDURE — 97110 THERAPEUTIC EXERCISES: CPT

## 2020-02-10 PROCEDURE — 97530 THERAPEUTIC ACTIVITIES: CPT

## 2020-02-10 NOTE — PROGRESS NOTES
PT DAILY TREATMENT NOTE - [facility] -15    Patient Name: Tona Laird  Date:2/10/2020  : 1985  [x]  Patient  Verified  Payor: BLUE CROSS / Plan: Juan Miguel Zheng [id number] PPO / Product Type: PPO /    In time: 7:34 a Out time: 8:30 a  Total Treatment Time (min): 56  Total Timed Codes (min): 56  1:1 Treatment Time ( only): 24  Visit #:  48    Treatment Area: Left ankle pain [M25.572]    SUBJECTIVE  Pain Level (0-10 scale): 0/10  Any medication changes, allergies to medications, adverse drug reactions, diagnosis change, or new procedure performed?: [x] No    [] Yes (see summary sheet for update)  Subjective functional status/changes:   [] No changes reported  Pt reports he has been doing well; no new changes since last session. OBJECTIVE               Modality rationale: decrease edema, decrease inflammation and decrease pain to improve the patients ability to run without pain     Min Type Additional Details        []? ? Estim: []??Att   []? ? Unatt    []? ?TENS instruct                  []? ?IFC  []? ?Premod   []? ?NMES                     []? ? Other:  []??w/US   []? ?w/ice   []? ?w/heat  Position:  Location:        []? ?  Traction: []?? Cervical       []? ?Lumbar                       []? ? Prone          []? ?Supine                       []? ?Intermittent   []? ? Continuous Lbs:  []?? before manual  []? ? after manual  []? ?w/heat     []? ?  Ultrasound: []??Continuous   []? ? Pulsed                       at: []??1MHz   []? ? 3MHz Location:  W/cm2:     []? ? Paraffin     Location:   []??w/heat    to go [x]? ?  Ice     []? ?  Heat  []? ?  Ice massage Position:  Location:     []? ?  Laser  []? ?  Other: Position:  Location:        []? ?  Vasopneumatic Device Pressure:       []? ? lo []? ? med []?? hi   Temperature:       [x]? ? Skin assessment post-treatment:  [x]? ? intact []? ?redness- no adverse reaction    []? ?redness  adverse reaction:      38 min Therapeutic Exercise:  [x]? ?? See flow sheet :   Rationale: increase ROM and increase strength to improve the patients ability to perform ADLs and reduce pain levels      18 min Therapeutic Activity:  []? See flow sheet : prowler sled push; cutting; SL side-side hopping   Rationale: increase ROM, increase strength, improve coordination, improve balance and increase proprioception  to improve the patients ability to push and run        With   []??? TE   []??? TA   []??? neuro   []? ?? other: Patient Education: [x]??? Review HEP    []? ?? Progressed/Changed HEP based on:   []??? positioning   []? ?? body mechanics   []? ?? transfers   []? ?? heat/ice application    []? ?? other:       Other Objective/Functional Measures:        Pain Level (0-10 scale) post treatment: 0/10     ASSESSMENT/Changes in Function:   Challenged Pt sprinting and cutting movements today, emphasizing push-off and reaction time; Pt able to perform without pain. Introduced SL side-side line hops for improved lateral movement, cuing for appropriate landing mechanics. Patient will continue to benefit from skilled PT services to modify and progress therapeutic interventions, address functional mobility deficits, address ROM deficits, address strength deficits, analyze and address soft tissue restrictions, analyze and cue movement patterns, analyze and modify body mechanics/ergonomics and assess and modify postural abnormalities to attain remaining goals.     []? ??  See Plan of Care  []? ??  See progress note/recertification  []? ??  See Discharge Summary     Progress towards goals / Updated goals:  Short Term Goals: To be accomplished in 3-4 weeks:  1)  Pt will be independent with HEP. MET  2) Pt will demonstrate >/= 6 degrees to be able to ambulate on level surface with more normalized gait without increase of pain MET  3) Pt will be able to navigate stairs with step over step pattern without pain.  MET      Long Term Goals: To be accomplished in 20-24 weeks:  1)  Pt will be able to squat to the ground without deviation and even distribution of weight. MET  2) Pt will be able to perform jumping motion without valgus collapse of knee durign concentric and eccentric phases   3) Pt will be able to run >/= 1 mile without pain. MET  4) Pt will be able to perform lateral cutting movements without pain or instability. MET  5) Pt will be able to demonstrate rotational control with jumping movements >/= to contralateral limb.   . .      PLAN  [x]???  Upgrade activities as tolerated     [x]? ??  Continue plan of care  [x]? ??  Update interventions per flow sheet       []???  Discharge due to:_  []???  Other:_      Jose Choi 2/10/2020

## 2020-02-12 ENCOUNTER — HOSPITAL ENCOUNTER (OUTPATIENT)
Dept: PHYSICAL THERAPY | Age: 35
Discharge: HOME | End: 2020-02-12
Payer: COMMERCIAL

## 2020-02-12 PROCEDURE — 97110 THERAPEUTIC EXERCISES: CPT | Performed by: PHYSICAL THERAPIST

## 2020-02-12 PROCEDURE — 97530 THERAPEUTIC ACTIVITIES: CPT | Performed by: PHYSICAL THERAPIST

## 2020-02-12 NOTE — PROGRESS NOTES
PT DAILY TREATMENT NOTE - [facility] -15    Patient Name: Arthur Castillo  Date:2020  : 1985  [x]  Patient  Verified  Payor: BLUE CROSS / Plan: Juan Miguel Zheng [id number] PPO / Product Type: PPO /    In time: 200a Out time: 905a  Total Treatment Time (min): 60  Total Timed Codes (min): 60  1:1 Treatment Time ( only): 30  Visit #:  47    Treatment Area: Left ankle pain [M25.572]    SUBJECTIVE  Pain Level (0-10 scale): 0/10  Any medication changes, allergies to medications, adverse drug reactions, diagnosis change, or new procedure performed?: [x] No    [] Yes (see summary sheet for update)  Subjective functional status/changes:   [] No changes reported  Pt states that he did not feel sore after last session or stiff this morning. OBJECTIVE               Modality rationale: decrease edema, decrease inflammation and decrease pain to improve the patients ability to run without pain     Min Type Additional Details        []? ? Estim: []??Att   []? ? Unatt    []? ?TENS instruct                  []? ?IFC  []? ?Premod   []? ?NMES                     []? ? Other:  []??w/US   []? ?w/ice   []? ?w/heat  Position:  Location:        []? ?  Traction: []?? Cervical       []? ?Lumbar                       []? ? Prone          []? ?Supine                       []? ?Intermittent   []? ? Continuous Lbs:  []?? before manual  []? ? after manual  []? ?w/heat     []? ?  Ultrasound: []??Continuous   []? ? Pulsed                       at: []??1MHz   []? ? 3MHz Location:  W/cm2:     []? ? Paraffin     Location:   []??w/heat    to go [x]? ?  Ice     []? ?  Heat  []? ?  Ice massage Position:  Location:     []? ?  Laser  []? ?  Other: Position:  Location:        []? ?  Vasopneumatic Device Pressure:       []? ? lo []? ? med []?? hi   Temperature:       [x]? ? Skin assessment post-treatment:  [x]? ? intact []? ?redness- no adverse reaction    []? ?redness  adverse reaction:      45 min Therapeutic Exercise:  [x]? ?? See flow sheet :   Rationale: increase ROM and increase strength to improve the patients ability to perform ADLs and reduce pain levels      15 min Therapeutic Activity:  []? See flow sheet : prowler sled push; cutting; SL side-side hopping   Rationale: increase ROM, increase strength, improve coordination, improve balance and increase proprioception  to improve the patients ability to push and run        With   []??? TE   []??? TA   []??? neuro   []? ?? other: Patient Education: [x]??? Review HEP    []? ?? Progressed/Changed HEP based on:   []??? positioning   []? ?? body mechanics   []? ?? transfers   []? ?? heat/ice application    []? ?? other:       Other Objective/Functional Measures:        Pain Level (0-10 scale) post treatment: 0/10     ASSESSMENT/Changes in Function:   Continue to improve movement confidence. Able to change directions without pain. Patient will continue to benefit from skilled PT services to modify and progress therapeutic interventions, address functional mobility deficits, address ROM deficits, address strength deficits, analyze and address soft tissue restrictions, analyze and cue movement patterns, analyze and modify body mechanics/ergonomics and assess and modify postural abnormalities to attain remaining goals.     []? ??  See Plan of Care  []? ??  See progress note/recertification  []? ??  See Discharge Summary     Progress towards goals / Updated goals:  Short Term Goals: To be accomplished in 3-4 weeks:  1)  Pt will be independent with HEP. MET  2) Pt will demonstrate >/= 6 degrees to be able to ambulate on level surface with more normalized gait without increase of pain MET  3) Pt will be able to navigate stairs with step over step pattern without pain. MET      Long Term Goals: To be accomplished in 20-24 weeks:  1)  Pt will be able to squat to the ground without deviation and even distribution of weight.  MET  2) Pt will be able to perform jumping motion without valgus collapse of knee durign concentric and eccentric phases   3) Pt will be able to run >/= 1 mile without pain. MET  4) Pt will be able to perform lateral cutting movements without pain or instability. MET  5) Pt will be able to demonstrate rotational control with jumping movements >/= to contralateral limb.   . .      PLAN  [x]???  Upgrade activities as tolerated     [x]? ??  Continue plan of care  [x]? ??  Update interventions per flow sheet       []???  Discharge due to:_  []???  Other:_      Eva Tobias, PT, DPT 2/12/2020

## 2020-02-18 ENCOUNTER — HOSPITAL ENCOUNTER (OUTPATIENT)
Dept: PHYSICAL THERAPY | Age: 35
Discharge: HOME | End: 2020-02-18
Payer: COMMERCIAL

## 2020-02-18 PROCEDURE — 97110 THERAPEUTIC EXERCISES: CPT | Performed by: PHYSICAL THERAPIST

## 2020-02-18 PROCEDURE — 97530 THERAPEUTIC ACTIVITIES: CPT | Performed by: PHYSICAL THERAPIST

## 2020-02-18 NOTE — PROGRESS NOTES
PT DAILY TREATMENT NOTE - [facility] 2-15    Patient Name: Jose Pérez  Date:2020  : 1985  [x]  Patient  Verified  Payor: BLUE CROSS / Plan: Juan Miguel Zheng [id number] PPO / Product Type: PPO /    In time: 200a Out time: 905a  Total Treatment Time (min): 60  Total Timed Codes (min): 60  1:1 Treatment Time ( only): 30  Visit #:  55    Treatment Area: Left ankle pain [M25.572]    SUBJECTIVE  Pain Level (0-10 scale): 0/10  Any medication changes, allergies to medications, adverse drug reactions, diagnosis change, or new procedure performed?: [x] No    [] Yes (see summary sheet for update)  Subjective functional status/changes:   [] No changes reported  Pt states that he did not feel sore after last session or stiff this morning. OBJECTIVE               Modality rationale: decrease edema, decrease inflammation and decrease pain to improve the patients ability to run without pain     Min Type Additional Details        []? ? Estim: []??Att   []? ? Unatt    []? ?TENS instruct                  []? ?IFC  []? ?Premod   []? ?NMES                     []? ? Other:  []??w/US   []? ?w/ice   []? ?w/heat  Position:  Location:        []? ?  Traction: []?? Cervical       []? ?Lumbar                       []? ? Prone          []? ?Supine                       []? ?Intermittent   []? ? Continuous Lbs:  []?? before manual  []? ? after manual  []? ?w/heat     []? ?  Ultrasound: []??Continuous   []? ? Pulsed                       at: []??1MHz   []? ? 3MHz Location:  W/cm2:     []? ? Paraffin     Location:   []??w/heat    to go [x]? ?  Ice     []? ?  Heat  []? ?  Ice massage Position:  Location:     []? ?  Laser  []? ?  Other: Position:  Location:        []? ?  Vasopneumatic Device Pressure:       []? ? lo []? ? med []?? hi   Temperature:       [x]? ? Skin assessment post-treatment:  [x]? ? intact []? ?redness- no adverse reaction    []? ?redness  adverse reaction:      45 min Therapeutic Exercise:  [x]? ?? See flow sheet :   Rationale: increase ROM and increase strength to improve the patients ability to perform ADLs and reduce pain levels      15 min Therapeutic Activity:  []? See flow sheet : prowler sled push; cutting; SL side-side hopping   Rationale: increase ROM, increase strength, improve coordination, improve balance and increase proprioception  to improve the patients ability to push and run        With   []??? TE   []??? TA   []??? neuro   []? ?? other: Patient Education: [x]??? Review HEP    []? ?? Progressed/Changed HEP based on:   []??? positioning   []? ?? body mechanics   []? ?? transfers   []? ?? heat/ice application    []? ?? other:       Other Objective/Functional Measures:   Single limb hop: L 5'5\"     82%                                R 6'8\"  Single limb Triple crossover hop: L 13'3\"     90%                                   R 14'9\"        Pain Level (0-10 scale) post treatment: 0/10     ASSESSMENT/Changes in Function:   Does still not land on forefoot as much on left with single limb hop but is improving. Recommend focus more on single limb jump rope and hopping. Patient will continue to benefit from skilled PT services to modify and progress therapeutic interventions, address functional mobility deficits, address ROM deficits, address strength deficits, analyze and address soft tissue restrictions, analyze and cue movement patterns, analyze and modify body mechanics/ergonomics and assess and modify postural abnormalities to attain remaining goals.     []? ??  See Plan of Care  []? ??  See progress note/recertification  []? ??  See Discharge Summary     Progress towards goals / Updated goals:  Short Term Goals: To be accomplished in 3-4 weeks:  1)  Pt will be independent with HEP. MET  2) Pt will demonstrate >/= 6 degrees to be able to ambulate on level surface with more normalized gait without increase of pain MET  3) Pt will be able to navigate stairs with step over step pattern without pain.  MET      Long Term Goals: To be accomplished in 20-24 weeks:  1)  Pt will be able to squat to the ground without deviation and even distribution of weight. MET  2) Pt will be able to perform jumping motion without valgus collapse of knee durign concentric and eccentric phases   3) Pt will be able to run >/= 1 mile without pain. MET  4) Pt will be able to perform lateral cutting movements without pain or instability. MET  5) Pt will be able to demonstrate rotational control with jumping movements >/= to contralateral limb.   . .      PLAN  [x]???  Upgrade activities as tolerated     [x]? ??  Continue plan of care  [x]? ??  Update interventions per flow sheet       []???  Discharge due to:_  []???  Other:_      Martin Miu, PT, DPT 2/18/2020

## 2020-02-28 ENCOUNTER — HOSPITAL ENCOUNTER (OUTPATIENT)
Dept: PHYSICAL THERAPY | Age: 35
Discharge: HOME | End: 2020-02-28
Payer: COMMERCIAL

## 2020-02-28 PROCEDURE — 97530 THERAPEUTIC ACTIVITIES: CPT | Performed by: PHYSICAL THERAPIST

## 2020-02-28 NOTE — PROGRESS NOTES
PT DAILY TREATMENT NOTE - [facility] -15    Patient Name: Brandy Callaway  Date:2020  : 1985  [x]  Patient  Verified  Payor: BLUE CROSS / Plan: Juan Miguel Zheng [id number] PPO / Product Type: PPO /    In time: 800a Out time: 910a  Total Treatment Time (min): 70  Total Timed Codes (min): 70  1:1 Treatment Time ( only): 30  Visit #:  64    Treatment Area: Left ankle pain [M25.572]    SUBJECTIVE  Pain Level (0-10 scale): 0/10  Any medication changes, allergies to medications, adverse drug reactions, diagnosis change, or new procedure performed?: [x] No    [] Yes (see summary sheet for update)  Subjective functional status/changes:   [] No changes reported  Pt states that he did not feel sore after last session or stiff this morning. OBJECTIVE               Modality rationale: decrease edema, decrease inflammation and decrease pain to improve the patients ability to run without pain     Min Type Additional Details        []? ? Estim: []??Att   []? ? Unatt    []? ?TENS instruct                  []? ?IFC  []? ?Premod   []? ?NMES                     []? ? Other:  []??w/US   []? ?w/ice   []? ?w/heat  Position:  Location:        []? ?  Traction: []?? Cervical       []? ?Lumbar                       []? ? Prone          []? ?Supine                       []? ?Intermittent   []? ? Continuous Lbs:  []?? before manual  []? ? after manual  []? ?w/heat     []? ?  Ultrasound: []??Continuous   []? ? Pulsed                       at: []??1MHz   []? ? 3MHz Location:  W/cm2:     []? ? Paraffin     Location:   []??w/heat    to go [x]? ?  Ice     []? ?  Heat  []? ?  Ice massage Position:  Location:     []? ?  Laser  []? ?  Other: Position:  Location:        []? ?  Vasopneumatic Device Pressure:       []? ? lo []? ? med []?? hi   Temperature:       [x]? ? Skin assessment post-treatment:  [x]? ? intact []? ?redness- no adverse reaction    []? ?redness  adverse reaction:      40 min Therapeutic Exercise:  [x]? ?? See flow sheet :   Rationale: increase ROM and increase strength to improve the patients ability to perform ADLs and reduce pain levels      30 min Therapeutic Activity:  []? See flow sheet : prowler sled push; cutting; SL side-side hopping   Rationale: increase ROM, increase strength, improve coordination, improve balance and increase proprioception  to improve the patients ability to push and run        With   []??? TE   []??? TA   []??? neuro   []? ?? other: Patient Education: [x]??? Review HEP    []? ?? Progressed/Changed HEP based on:   []??? positioning   []? ?? body mechanics   []? ?? transfers   []? ?? heat/ice application    []? ?? other:       Other Objective/Functional Measures:        Pain Level (0-10 scale) post treatment: 0/10     ASSESSMENT/Changes in Function:   Able to perform single limb bounding with greater force and change direction 90 degrees from lateral to forward from both right and left directions on the left leg. Patient will continue to benefit from skilled PT services to modify and progress therapeutic interventions, address functional mobility deficits, address ROM deficits, address strength deficits, analyze and address soft tissue restrictions, analyze and cue movement patterns, analyze and modify body mechanics/ergonomics and assess and modify postural abnormalities to attain remaining goals.     []? ??  See Plan of Care  []? ??  See progress note/recertification  []? ??  See Discharge Summary     Progress towards goals / Updated goals:  Short Term Goals: To be accomplished in 3-4 weeks:  1)  Pt will be independent with HEP. MET  2) Pt will demonstrate >/= 6 degrees to be able to ambulate on level surface with more normalized gait without increase of pain MET  3) Pt will be able to navigate stairs with step over step pattern without pain. MET      Long Term Goals: To be accomplished in 20-24 weeks:  1)  Pt will be able to squat to the ground without deviation and even distribution of weight.  MET  2) Pt will be able to perform jumping motion without valgus collapse of knee durign concentric and eccentric phases   3) Pt will be able to run >/= 1 mile without pain. MET  4) Pt will be able to perform lateral cutting movements without pain or instability. MET  5) Pt will be able to demonstrate rotational control with jumping movements >/= to contralateral limb.   . .      PLAN  [x]???  Upgrade activities as tolerated     [x]? ??  Continue plan of care  [x]? ??  Update interventions per flow sheet       []???  Discharge due to:_  []???  Other:_      Denisse Page, PT, DPT 2/28/2020

## 2020-03-04 ENCOUNTER — HOSPITAL ENCOUNTER (OUTPATIENT)
Dept: PHYSICAL THERAPY | Age: 35
Discharge: HOME | End: 2020-03-04
Payer: COMMERCIAL

## 2020-03-04 PROCEDURE — 97530 THERAPEUTIC ACTIVITIES: CPT | Performed by: PHYSICAL THERAPIST

## 2020-03-04 NOTE — PROGRESS NOTES
PT DAILY TREATMENT NOTE - [facility] -15    Patient Name: Pat Weber  Date:3/4/2020  : 1985  [x]  Patient  Verified  Payor: Opal Otero / Plan: Juan Miguel Zheng 5747 PPO / Product Type: PPO /    In time: 812a Out time: 910a  Total Treatment Time (min): 58  Total Timed Codes (min): 58  1:1 Treatment Time ( only): 30  Visit #:  62    Treatment Area: Left ankle pain [M25.572]    SUBJECTIVE  Pain Level (0-10 scale): 0/10  Any medication changes, allergies to medications, adverse drug reactions, diagnosis change, or new procedure performed?: [x] No    [] Yes (see summary sheet for update)  Subjective functional status/changes:   [] No changes reported  Pt states that he did feel slight heel pain the next day after last session but went away with acitivity. OBJECTIVE               Modality rationale: decrease edema, decrease inflammation and decrease pain to improve the patients ability to run without pain     Min Type Additional Details        []? ? Estim: []??Att   []? ? Unatt    []? ?TENS instruct                  []? ?IFC  []? ?Premod   []? ?NMES                     []? ? Other:  []??w/US   []? ?w/ice   []? ?w/heat  Position:  Location:        []? ?  Traction: []?? Cervical       []? ?Lumbar                       []? ? Prone          []? ?Supine                       []? ?Intermittent   []? ? Continuous Lbs:  []?? before manual  []? ? after manual  []? ?w/heat     []? ?  Ultrasound: []??Continuous   []? ? Pulsed                       at: []??1MHz   []? ? 3MHz Location:  W/cm2:     []? ? Paraffin     Location:   []??w/heat    to go [x]? ?  Ice     []? ?  Heat  []? ?  Ice massage Position:  Location:     []? ?  Laser  []? ?  Other: Position:  Location:        []? ?  Vasopneumatic Device Pressure:       []? ? lo []? ? med []?? hi   Temperature:       [x]? ? Skin assessment post-treatment:  [x]? ? intact []? ?redness- no adverse reaction    []? ?redness  adverse reaction:      28 min Therapeutic Exercise:  [x]? ?? See flow sheet :   Rationale: increase ROM and increase strength to improve the patients ability to perform ADLs and reduce pain levels      30 min Therapeutic Activity:  []? See flow sheet : prowler sled push; cutting; SL side-side hopping, jumping and landing with contact while in air   Rationale: increase ROM, increase strength, improve coordination, improve balance and increase proprioception  to improve the patients ability to push and run        With   []??? TE   []??? TA   []??? neuro   []? ?? other: Patient Education: [x]??? Review HEP    []? ?? Progressed/Changed HEP based on:   []??? positioning   []? ?? body mechanics   []? ?? transfers   []? ?? heat/ice application    []? ?? other:       Other Objective/Functional Measures:--        Pain Level (0-10 scale) post treatment: 0/10     ASSESSMENT/Changes in Function:   Able to receive contact at shoulder and hip while jumping in air and land on left foot and cut off of the left foot prior to jumping all without pain and demonstrated accurate technique. PT will attempt to increase running frequency by next session. Patient will continue to benefit from skilled PT services to modify and progress therapeutic interventions, address functional mobility deficits, address ROM deficits, address strength deficits, analyze and address soft tissue restrictions, analyze and cue movement patterns, analyze and modify body mechanics/ergonomics and assess and modify postural abnormalities to attain remaining goals.     []? ??  See Plan of Care  []? ??  See progress note/recertification  []? ??  See Discharge Summary     Progress towards goals / Updated goals:  Short Term Goals: To be accomplished in 3-4 weeks:  1)  Pt will be independent with HEP. MET  2) Pt will demonstrate >/= 6 degrees to be able to ambulate on level surface with more normalized gait without increase of pain MET  3) Pt will be able to navigate stairs with step over step pattern without pain.  MET      Long Term Goals: To be accomplished in 20-24 weeks:  1)  Pt will be able to squat to the ground without deviation and even distribution of weight. MET  2) Pt will be able to perform jumping motion without valgus collapse of knee durign concentric and eccentric phases   3) Pt will be able to run >/= 1 mile without pain. MET  4) Pt will be able to perform lateral cutting movements without pain or instability. MET  5) Pt will be able to demonstrate rotational control with jumping movements >/= to contralateral limb.   . .      PLAN  [x]???  Upgrade activities as tolerated     [x]? ??  Continue plan of care  [x]? ??  Update interventions per flow sheet       []???  Discharge due to:_  []???  Other:_      Raymond Verma PT, DPT 3/4/2020

## 2020-03-11 ENCOUNTER — HOSPITAL ENCOUNTER (OUTPATIENT)
Dept: PHYSICAL THERAPY | Age: 35
Discharge: HOME | End: 2020-03-11
Payer: COMMERCIAL

## 2020-03-11 PROCEDURE — 97110 THERAPEUTIC EXERCISES: CPT | Performed by: PHYSICAL THERAPIST

## 2020-03-11 PROCEDURE — 97530 THERAPEUTIC ACTIVITIES: CPT | Performed by: PHYSICAL THERAPIST

## 2020-03-11 NOTE — PROGRESS NOTES
PT DAILY TREATMENT NOTE - [facility] 2-15    Patient Name: Arthur Castillo  Date:3/11/2020  : 1985  [x]  Patient  Verified  Payor: Sue Figueredo / Plan: Juan Miguel Zheng [id number] PPO / Product Type: PPO /    In time: 200a Out time: 920  Total Treatment Time (min): 75  Total Timed Codes (min): 75  1:1 Treatment Time ( only): 70  Visit #:  62    Treatment Area: Left ankle pain [M25.572]    SUBJECTIVE  Pain Level (0-10 scale): 0/10  Any medication changes, allergies to medications, adverse drug reactions, diagnosis change, or new procedure performed?: [x] No    [] Yes (see summary sheet for update)  Subjective functional status/changes:   [] No changes reported  Pt states that he is still running 1-2 miles without any difficulty and no pain in the achilles. OBJECTIVE         45 min Therapeutic Exercise:  [x]? ?? See flow sheet :   Rationale: increase ROM and increase strength to improve the patients ability to perform ADLs and reduce pain levels      30 min Therapeutic Activity:  []? See flow sheet : prowler sled push; cutting; SL side-side hopping, jumping and landing with contact while in air   Rationale: increase ROM, increase strength, improve coordination, improve balance and increase proprioception  to improve the patients ability to push and run        With   []??? TE   []??? TA   []??? neuro   []? ?? other: Patient Education: [x]??? Review HEP    []? ?? Progressed/Changed HEP based on:   []??? positioning   []? ?? body mechanics   []? ?? transfers   []? ?? heat/ice application    []? ?? other:       Other Objective/Functional Measures:--  Single Hop Test : R 7.25 ft                               L 6.75 ft    93%     Triple crossover hop Test: R 17.5 ft                                              L 16.5 ft     94%    FOTO Functional Measure: Intake 57/100     Discharge 99/100        Pain Level (0-10 scale) post treatment: 0/10     ASSESSMENT/Changes in Function:   Consistently is able to improve movement pattern once cued for correct performance. He has been provided specific strengthening and agility progression to continue on his own at this point as he 93% and 94% level of performance on single limb hop and triple crossover hop, respectively. Has returned to desired level of running without pain or instability.     []? ??  See Plan of Care  []? ??  See progress note/recertification  [x]? ??  See Discharge Summary     Progress towards goals / Updated goals:  Short Term Goals: To be accomplished in 3-4 weeks:  1)  Pt will be independent with HEP. MET  2) Pt will demonstrate >/= 6 degrees to be able to ambulate on level surface with more normalized gait without increase of pain MET  3) Pt will be able to navigate stairs with step over step pattern without pain. MET      Long Term Goals: To be accomplished in 20-24 weeks:  1)  Pt will be able to squat to the ground without deviation and even distribution of weight. MET  2) Pt will be able to perform jumping motion without valgus collapse of knee durign concentric and eccentric phases   3) Pt will be able to run >/= 1 mile without pain. MET  4) Pt will be able to perform lateral cutting movements without pain or instability. MET  5) Pt will be able to demonstrate rotational control with jumping movements >/= to contralateral limb.   . .      PLAN  [x]???  Upgrade activities as tolerated     [x]? ??  Continue plan of care  [x]? ??  Update interventions per flow sheet       []???  Discharge due to:_  []???  Other:_      Destin Horn, PT, DPT 3/11/2020

## 2020-03-13 NOTE — ANCILLARY DISCHARGE INSTRUCTIONS
[address] [facility] Physical [facility] [address] [address], [address] Phone: [phone]  Fax: [phone] Discharge Summary  2-15 Patient name: Brandy Callaway  : 1985  Provider#: [id number] Referral source: Carri Nagel MD     
Medical/Treatment Diagnosis: Left ankle pain [M25.572] Prior Hospitalization: see medical history Comorbidities: none Prior Level of Function: complete 20 minutes of exercise at least 3 times a week; [address] Medications: Verified on Patient Summary List 
  
Start of Care: 2019                                                                 Onset Date: 2019     
Visits from Start of Care: 58     Missed Visits: 0 Reporting Period : 2019 to 3/11/2020 Progress towards goals / Updated goals: 
Short Term Goals: To be accomplished in 3-4 weeks: 
1)  Pt will be independent with HEP. MET 
2) Pt will demonstrate >/= 6 degrees to be able to ambulate on level surface with more normalized gait without increase of pain MET 3) Pt will be able to navigate stairs with step over step pattern without pain. MET  
  
Long Term Goals: To be accomplished in 20-24 weeks: 
1)  Pt will be able to squat to the ground without deviation and even distribution of weight. MET 
2) Pt will be able to perform jumping motion without valgus collapse of knee durign concentric and eccentric phases 3) Pt will be able to run >/= 1 mile without pain. MET 
4) Pt will be able to perform lateral cutting movements without pain or instability. MET 
5) Pt will be able to demonstrate rotational control with jumping movements >/= to contralateral limb. MET 
  
 
 
ASSESSMENT/SUMMARY OF CARE: Consistently is able to improve movement pattern once cued for correct performance.   He has been provided specific strengthening and agility progression to continue on his own at this point as he 93% and 94% level of performance on single limb hop and triple crossover hop, respectively. Has returned to desired level of running without pain or instability. Single Hop Test : R 7.25 ft 
                             L 6.75 ft    93% 
  
Triple crossover hop Test: R 17.5 ft 
                                            L 16.5 ft     94% 
  
FOTO Functional Measure: Intake 57/100     Discharge 99/100 
  
  
 
RECOMMENDATIONS: 
[x]Discontinue therapy: [x]Patient has reached or is progressing toward set goals []Patient is non-compliant or has abdicated 
    []Due to lack of appreciable progress towards set goals Denisse Page, PT, DPT 3/13/2020

## 2022-06-15 NOTE — PROGRESS NOTES
PT DAILY TREATMENT NOTE - [facility] -15    Patient Name: Stephani Clemons  Date:2020  : 1985  [x]  Patient  Verified  Payor: BLUE CROSS / Plan: Juan Miguel Zheng [id number] PPO / Product Type: PPO /    In time: 810a Out time: 910a  Total Treatment Time (min): 60  Total Timed Codes (min): 60  1:1 Treatment Time ( only): 30  Visit #:  48    Treatment Area: Left ankle pain [M25.572]    SUBJECTIVE  Pain Level (0-10 scale): 0/10  Any medication changes, allergies to medications, adverse drug reactions, diagnosis change, or new procedure performed?: [x] No    [] Yes (see summary sheet for update)  Subjective functional status/changes:   [] No changes reported  Pt reports that he was not sore after last session. OBJECTIVE               Modality rationale: decrease edema, decrease inflammation and decrease pain to improve the patients ability to run without pain     Min Type Additional Details        []? ? Estim: []??Att   []? ? Unatt    []? ?TENS instruct                  []? ?IFC  []? ?Premod   []? ?NMES                     []? ? Other:  []??w/US   []? ?w/ice   []? ?w/heat  Position:  Location:        []? ?  Traction: []?? Cervical       []? ?Lumbar                       []? ? Prone          []? ?Supine                       []? ?Intermittent   []? ? Continuous Lbs:  []?? before manual  []? ? after manual  []? ?w/heat     []? ?  Ultrasound: []??Continuous   []? ? Pulsed                       at: []??1MHz   []? ? 3MHz Location:  W/cm2:     []? ? Paraffin     Location:   []??w/heat    to go [x]? ?  Ice     []? ?  Heat  []? ?  Ice massage Position:  Location:     []? ?  Laser  []? ?  Other: Position:  Location:        []? ?  Vasopneumatic Device Pressure:       []? ? lo []? ? med []?? hi   Temperature:       [x]? ? Skin assessment post-treatment:  [x]? ? intact []? ?redness- no adverse reaction    []? ?redness  adverse reaction:      35 min Therapeutic Exercise:  [x]? ?? See flow sheet :   Rationale: increase ROM and increase strength to improve the patients ability to perform ADLs and reduce pain levels      15  min Therapeutic Activity:  []? See flow sheet : prowler sled push; , line sprints with directional changing, transition turning   Rationale: increase ROM, increase strength, improve coordination, improve balance and increase proprioception  to improve the patients ability to push and run     10 min Manual Therapy:  STM achilles distal insertion and mid portion of lateral gastroc    Rationale: decrease pain, increase ROM and increase tissue extensibility  to improve the patients ability to perform ADLs and reduce pain levels     With   []??? TE   []??? TA   []??? neuro   []? ?? other: Patient Education: [x]??? Review HEP    []? ?? Progressed/Changed HEP based on:   []??? positioning   []? ?? body mechanics   []? ?? transfers   []? ?? heat/ice application    []? ?? other:       Other Objective/Functional Measures:      Pain Level (0-10 scale) post treatment: 0/10     ASSESSMENT/Changes in Function:   Demonstrated correct foot work to creat transitional movement from back pedal to forward running without loss of balance. Patient will continue to benefit from skilled PT services to modify and progress therapeutic interventions, address functional mobility deficits, address ROM deficits, address strength deficits, analyze and address soft tissue restrictions, analyze and cue movement patterns, analyze and modify body mechanics/ergonomics and assess and modify postural abnormalities to attain remaining goals.     []? ??  See Plan of Care  []? ??  See progress note/recertification  []? ??  See Discharge Summary     Progress towards goals / Updated goals:  Short Term Goals: To be accomplished in 3-4 weeks:  1)  Pt will be independent with HEP. MET  2) Pt will demonstrate >/= 6 degrees to be able to ambulate on level surface with more normalized gait without increase of pain MET  3) Pt will be able to navigate stairs with step over step pattern without pain. MET      Long Term Goals: To be accomplished in 20-24 weeks:  1)  Pt will be able to squat to the ground without deviation and even distribution of weight. MET  2) Pt will be able to perform jumping motion without valgus collapse of knee durign concentric and eccentric phases   3) Pt will be able to run >/= 1 mile without pain. MET  4) Pt will be able to perform lateral cutting movements without pain or instability. MET  5) Pt will be able to demonstrate rotational control with jumping movements >/= to contralateral limb.   . .      PLAN  [x]???  Upgrade activities as tolerated     [x]? ??  Continue plan of care  [x]? ??  Update interventions per flow sheet       []???  Discharge due to:_  []???  Other:_      Gene Crump, PT, DPT 1/24/2020 [Nl] : Musculoskeletal [No Acute Changes] : No acute changes since previous visit [Change in Activity] : no change in activity [Rash] : no rash